# Patient Record
Sex: FEMALE | Race: WHITE | NOT HISPANIC OR LATINO
[De-identification: names, ages, dates, MRNs, and addresses within clinical notes are randomized per-mention and may not be internally consistent; named-entity substitution may affect disease eponyms.]

---

## 2021-08-02 ENCOUNTER — RESULT REVIEW (OUTPATIENT)
Age: 56
End: 2021-08-02

## 2022-04-20 ENCOUNTER — APPOINTMENT (OUTPATIENT)
Dept: UROLOGY | Facility: CLINIC | Age: 57
End: 2022-04-20
Payer: COMMERCIAL

## 2022-04-20 VITALS
BODY MASS INDEX: 32.93 KG/M2 | HEIGHT: 70 IN | SYSTOLIC BLOOD PRESSURE: 131 MMHG | WEIGHT: 230 LBS | HEART RATE: 94 BPM | DIASTOLIC BLOOD PRESSURE: 93 MMHG

## 2022-04-20 PROCEDURE — 99204 OFFICE O/P NEW MOD 45 MIN: CPT

## 2022-04-20 NOTE — ASSESSMENT
[FreeTextEntry1] : \par plan:\par - start pelvic floor exercises\par - start vesicare\par - schedule appointment with Dr. Cisneros to discuss follow up and possible interstim

## 2022-04-20 NOTE — PHYSICAL EXAM
[General Appearance - Well Developed] : well developed [General Appearance - Well Nourished] : well nourished [Normal Appearance] : normal appearance [Well Groomed] : well groomed [General Appearance - In No Acute Distress] : no acute distress [] : no respiratory distress [Respiration, Rhythm And Depth] : normal respiratory rhythm and effort [Oriented To Time, Place, And Person] : oriented to person, place, and time [Exaggerated Use Of Accessory Muscles For Inspiration] : no accessory muscle use [Affect] : the affect was normal [Mood] : the mood was normal [Not Anxious] : not anxious

## 2022-04-20 NOTE — HISTORY OF PRESENT ILLNESS
[Urinary Urgency] : urinary urgency [FreeTextEntry1] : 56 yo F for initial consultation\par no PMH\par 1/2021 head injury with a concussion\par also had neck surgery after that\par allergy to penicillins (hives)\par quit smoking 1.5 years ago\par \par 2017 started complaining of urinary urgency and OAB\par had UDS describing severe OAB with strong desire ate 90 ml and capacity of 102\par there is a uroflow documented with a volume of 500 ml, confirming this is not a small contracted bladder\par \par tried oxybutynin in the past with no effect\par discussed pelvic floor exercises and a trial of vesicare.\par \par \par plan:\par - start pelvic floor exercises\par - start vesicare\par - schedule appointment with Dr. Cisneros to discuss follow up and possible interstim

## 2022-04-27 ENCOUNTER — APPOINTMENT (OUTPATIENT)
Dept: ORTHOPEDIC SURGERY | Facility: CLINIC | Age: 57
End: 2022-04-27
Payer: OTHER MISCELLANEOUS

## 2022-04-27 VITALS — HEIGHT: 70 IN | BODY MASS INDEX: 32.93 KG/M2 | WEIGHT: 230 LBS

## 2022-04-27 DIAGNOSIS — Z78.9 OTHER SPECIFIED HEALTH STATUS: ICD-10-CM

## 2022-04-27 PROCEDURE — 20611 DRAIN/INJ JOINT/BURSA W/US: CPT | Mod: 50

## 2022-04-27 PROCEDURE — 99214 OFFICE O/P EST MOD 30 MIN: CPT | Mod: 25

## 2022-04-27 PROCEDURE — 99072 ADDL SUPL MATRL&STAF TM PHE: CPT

## 2022-04-27 PROCEDURE — J3490M: CUSTOM

## 2022-04-27 NOTE — PHYSICAL EXAM
[Right] : right knee [Left] : left knee [NL (0)] : extension 0 degrees [5___] : hamstring 5[unfilled]/5 [Equivocal] : equivocal Ashley [] : patient ambulates without assistive device [TWNoteComboBox7] : flexion 115 degrees

## 2022-04-27 NOTE — HISTORY OF PRESENT ILLNESS
[6] : 6 [3] : 3 [Dull/Aching] : dull/aching [Sharp] : sharp [de-identified] : WC DOI 1/5/2021\par 1/27/21: bilateral knee made worse after new injury on 1/5/21.\par 2/24/21: bilateral knee pain persists.\par 3/24/21: follow up bilateral knees. reports to improvement with pt and injections. still having some mild symptoms but overall better.\par 5/5/21: bilateral knee pain still present. right worse than left today.\par 5/26/21: follow up bilateral knees. recent superficial lac on right knee from palm tree on 5/22/21 - received 14 sutures in er\par 6/23/21: bilateral knee pain still. superficial wound right medial knee doing well - sutures removed; cleared by pcp.\par 7/28/21: bilateral knee pain improved but still has some symptoms.\par 9/1/21: bilateral knee pain still present.\par 10/13/21: bilateral knee pain. just restarted pt.\par 11/24/21: bilateral knee pain persists. PT helps.\par 1/5/22: bilateral knee pain mildly improved but still present.\par 2/16/22: bilateral knee pain.\par 3/2/22: bilateral knee pain.\par 3/9/22: bilateral knee pain.\par 3/16/22: bilateral knee pain persists.\par 4/27/22:  follow up bilateral knees.  some relief with visco but does get flares up with weather changes and prolonged activities.  reports to tightness.  [FreeTextEntry1] : kingt knees [de-identified] : massage

## 2022-04-27 NOTE — WORK
[Total] : total [Does not reveal pre-existing condition(s) that may affect treatment/prognosis] : does not reveal pre-existing condition(s) that may affect treatment/prognosis [Cannot return to work because ________] : cannot return to work because [unfilled] [Bending/Twisting] : bending/twisting [Climbing stairs/Ladders] : climbing stairs/ladders [Kneeling] : kneeling [Walking] : walking [Standing] : standing [Patient] : patient [No Rx restrictions] : No Rx restrictions. [I provided the services listed above] :  I provided the services listed above. 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

## 2022-04-27 NOTE — DISCUSSION/SUMMARY
[de-identified] : Instructions:  Progress Note completed by Daphne Porter PA-C\par * Dr. Rosas -- The documentation recorded accurately reflects the decisions made by me during this visit.

## 2022-04-27 NOTE — ASSESSMENT
[FreeTextEntry1] : new injury on 1/5/21 when large monitor fell off wall at work and pinned her again the desk after pushing her back. history of knee scopes. worse pain after accident. \par \par mris suggest new tear left mm and oa exacerbation. left knee pain persists. PT helps.\par right knee mri suggest oa exacerbation right knee with sprain. PT helps.\par \par has concussion symptoms as well which she is getting treatment for.\par scheduled for neck surgery on 8/2/21.

## 2022-04-27 NOTE — PROCEDURE
[FreeTextEntry3] : Procedure Name: Large Joint Injection / Aspiration: Celestone, Lidocaine, Evaluation and Guidance Ultrasound\par \par Large Joint Injection was performed because of pain and inflammation. Anesthesia: ethyl chloride sprayed topically.\par Celestone: An injection of Celestone 6 mg , 1 cc.\par Lidocaine 1%: 3 cc.\par Marcaine 0.25%:  3 cc.\par \par Patient has tried OTC's including aspirin, Ibuprofen, Aleve etc or prescription NSAIDS, and/or exercises at home and/ or physical therapy without satisfactory response and Patient has decreased mobility in the joint. The risks, benefits, and alternatives to cortisone injection were explained in full to the patient. Risks outlined include but are not limited to infection, sepsis, bleeding, scarring, skin discoloration, temporary increase in pain, syncopal episode, failure to resolve symptoms, allergic reaction, symptom recurrence, and elevation of blood sugar in diabetics. Patient understood the risks. All questions were answered.   Oral informed consent was obtained.   Sterile technique was utilized for the procedure including the preparation of the solutions used for the injection. Medication was injected into LEFT KNEE.   Patient tolerated the procedure well. Advised to ice the injection site this evening.  Post Procedure Instructions: Patient was advised to call if redness, pain, or fever occur and apply ice for 15 min. out of every hour for the next 12-24 hours as tolerated. patient was advised to rest the joint(s) for 2 days. \par \par Ultrasound Extremity (11334) was used because of the following reasons: inflammation.\par Ultrasound Guidance was used for the following reasons: for accurate placement of needle into knee joint.\par Diagnostic ultrasound was performed of the knee and is positive for synovitis.\par Ultrasound guided injection was performed of the knee, visualization of the needle and placement of injection was performed without complication.\par

## 2022-05-04 ENCOUNTER — APPOINTMENT (OUTPATIENT)
Dept: UROLOGY | Facility: CLINIC | Age: 57
End: 2022-05-04
Payer: COMMERCIAL

## 2022-05-04 DIAGNOSIS — Z83.511 FAMILY HISTORY OF GLAUCOMA: ICD-10-CM

## 2022-05-04 DIAGNOSIS — Z82.3 FAMILY HISTORY OF STROKE: ICD-10-CM

## 2022-05-04 DIAGNOSIS — Z83.3 FAMILY HISTORY OF DIABETES MELLITUS: ICD-10-CM

## 2022-05-04 DIAGNOSIS — Z82.49 FAMILY HISTORY OF ISCHEMIC HEART DISEASE AND OTHER DISEASES OF THE CIRCULATORY SYSTEM: ICD-10-CM

## 2022-05-04 PROCEDURE — 99213 OFFICE O/P EST LOW 20 MIN: CPT

## 2022-05-04 RX ORDER — ERGOCALCIFEROL 1.25 MG/1
1.25 MG CAPSULE, LIQUID FILLED ORAL
Qty: 4 | Refills: 0 | Status: ACTIVE | COMMUNITY
Start: 2022-05-04

## 2022-05-04 NOTE — ASSESSMENT
[FreeTextEntry1] : Impression:\par \par urinary frequency and urgency. \par \par Plan:\par \par continue solifenacin\par can increase dose if needed\par Continue Kegel's\par can proceed to Interstim, PTNS or increased dose of solifenacin as needed. \par Follow up in 3 months. \par

## 2022-05-04 NOTE — HISTORY OF PRESENT ILLNESS
[FreeTextEntry1] : Patient has urgency and incontinence.  Has urinary frequency.  has been doing kegel's  urgency.  She has been using a panty liner but has not  worn one recently since starting solifenacin. has dry mouth but no significant constipation.  has been under

## 2022-05-04 NOTE — LETTER BODY
[Dear  ___] : Dear  [unfilled], [Courtesy Letter:] : I had the pleasure of seeing your patient, [unfilled], in my office today. [Please see my note below.] : Please see my note below. [Sincerely,] : Sincerely, [FreeTextEntry3] : Ed\par \par Ridge Cisneros MD\par [facility]\par  of Urology\par [employer] [facility]\par

## 2022-05-08 ENCOUNTER — RX RENEWAL (OUTPATIENT)
Age: 57
End: 2022-05-08

## 2022-06-08 ENCOUNTER — APPOINTMENT (OUTPATIENT)
Dept: ORTHOPEDIC SURGERY | Facility: CLINIC | Age: 57
End: 2022-06-08
Payer: OTHER MISCELLANEOUS

## 2022-06-08 VITALS — BODY MASS INDEX: 32.93 KG/M2 | WEIGHT: 230 LBS | HEIGHT: 70 IN

## 2022-06-08 PROCEDURE — 99214 OFFICE O/P EST MOD 30 MIN: CPT | Mod: 25

## 2022-06-08 PROCEDURE — J3490M: CUSTOM

## 2022-06-08 PROCEDURE — 99072 ADDL SUPL MATRL&STAF TM PHE: CPT

## 2022-06-08 PROCEDURE — 20611 DRAIN/INJ JOINT/BURSA W/US: CPT | Mod: RT

## 2022-06-08 NOTE — ASSESSMENT
[FreeTextEntry1] : new injury on 1/5/21 when large monitor fell off wall at work and pinned her again the desk after pushing her back. history of knee scopes. worse pain after accident. \par \par mris suggest new tear left mm and oa exacerbation. left knee pain persists.injection helped.\par right knee mri suggest oa exacerbation right knee with sprain. PT helps. pain persists.\par \par has concussion symptoms as well which she is getting treatment for.\par scheduled for neck surgery on 8/2/21.

## 2022-06-08 NOTE — PROCEDURE
[Large Joint Injection] : Large joint injection [Right] : of the right [Knee] : knee [Pain] : pain [Alcohol] : alcohol [Betadine] : betadine [___ cc    3mg] :  Betamethasone (Celestone) ~Vcc of 3mg [___ cc    1%] : Lidocaine ~Vcc of 1%  [___ cc    0.25%] : Bupivacaine (Marcaine) ~Vcc of 0.25%  [] : Patient tolerated procedure well [Call if redness, pain or fever occur] : call if redness, pain or fever occur [Apply ice for 15min out of every hour for the next 12-24 hours as tolerated] : apply ice for 15 minutes out of every hour for the next 12-24 hours as tolerated [Patient was advised to rest the joint(s) for ____ days] : patient was advised to rest the joint(s) for [unfilled] days [Previous OTC use and PT nontherapeutic] : patient has tried OTC's including aspirin, Ibuprofen, Aleve, etc or prescription NSAIDS, and/or exercises at home and/or physical therapy without satisfactory response [Patient had decreased mobility in the joint] : patient had decreased mobility in the joint [Risks, benefits, alternatives discussed / Verbal consent obtained] : the risks benefits, and alternatives have been discussed, and verbal consent was obtained [All ultrasound images have been permanently captured and stored accordingly in our picture archiving and communication system] : All ultrasound images have been permanently captured and stored accordingly in our picture archiving and communication system [Visualization of the needle and placement of injection was performed without complication] : visualization of the needle and placement of injection was performed without complication [Inflammation] : inflammation

## 2022-06-08 NOTE — HISTORY OF PRESENT ILLNESS
[8] : 8 [3] : 3 [de-identified] : WC DOI 1/5/2021\par 1/27/21: bilateral knee made worse after new injury on 1/5/21.\par 2/24/21: bilateral knee pain persists.\par 3/24/21: follow up bilateral knees. reports to improvement with pt and injections. still having some mild symptoms but overall better.\par 5/5/21: bilateral knee pain still present. right worse than left today.\par 5/26/21: follow up bilateral knees. recent superficial lac on right knee from palm tree on 5/22/21 - received 14 sutures in er\par 6/23/21: bilateral knee pain still. superficial wound right medial knee doing well - sutures removed; cleared by pcp.\par 7/28/21: bilateral knee pain improved but still has some symptoms.\par 9/1/21: bilateral knee pain still present.\par 10/13/21: bilateral knee pain. just restarted pt.\par 11/24/21: bilateral knee pain persists. PT helps.\par 1/5/22: bilateral knee pain mildly improved but still present.\par 2/16/22: bilateral knee pain.\par 3/2/22: bilateral knee pain.\par 3/9/22: bilateral knee pain.\par 3/16/22: bilateral knee pain persists.\par 4/27/22:  follow up bilateral knees.  some relief with visco but does get flares up with weather changes and prolonged activities.  reports to tightness. \par 6/8/22:  left knee pain improved.  right knee pain persists. [FreeTextEntry1] : joseline knees [de-identified] : overuse  [de-identified] : cortisone injection lt knee \par PT

## 2022-06-15 ENCOUNTER — APPOINTMENT (OUTPATIENT)
Dept: ORTHOPEDIC SURGERY | Facility: CLINIC | Age: 57
End: 2022-06-15
Payer: OTHER MISCELLANEOUS

## 2022-06-15 VITALS — WEIGHT: 230 LBS | HEIGHT: 70 IN | BODY MASS INDEX: 32.93 KG/M2

## 2022-06-15 PROCEDURE — 99213 OFFICE O/P EST LOW 20 MIN: CPT

## 2022-06-15 PROCEDURE — 99072 ADDL SUPL MATRL&STAF TM PHE: CPT

## 2022-06-15 NOTE — DISCUSSION/SUMMARY
[de-identified] : Instructions:  Progress Note completed by Daphne Porter PA-C\par * Dr. Rosas -- The documentation recorded accurately reflects the decisions made by me during this visit.

## 2022-06-15 NOTE — ASSESSMENT
[FreeTextEntry1] : new injury on 1/5/21 when large monitor fell off wall at work and pinned her again the desk after pushing her back. history of knee scopes. worse pain after accident. \par \par mris 2021  suggest new tear left mm and oa exacerbation. left knee pain persists.injection helped.\par right knee mri 2021 suggest oa exacerbation right knee with sprain. PT helps. had injection 6/8/22 with minimal relief. \par \par has concussion symptoms as well which she is getting treatment for.\par scheduled for neck surgery on 8/2/21.

## 2022-06-15 NOTE — HISTORY OF PRESENT ILLNESS
[Work related] : work related [Not working due to injury] : Work status: not working due to injury [] : yes [8] : 8 [3] : 3 [de-identified] : WC DOI 1/5/2021\par 1/27/21: bilateral knee made worse after new injury on 1/5/21.\par 2/24/21: bilateral knee pain persists.\par 3/24/21: follow up bilateral knees. reports to improvement with pt and injections. still having some mild symptoms but overall better.\par 5/5/21: bilateral knee pain still present. right worse than left today.\par 5/26/21: follow up bilateral knees. recent superficial lac on right knee from palm tree on 5/22/21 - received 14 sutures in er\par 6/23/21: bilateral knee pain still. superficial wound right medial knee doing well - sutures removed; cleared by pcp.\par 7/28/21: bilateral knee pain improved but still has some symptoms.\par 9/1/21: bilateral knee pain still present.\par 10/13/21: bilateral knee pain. just restarted pt.\par 11/24/21: bilateral knee pain persists. PT helps.\par 1/5/22: bilateral knee pain mildly improved but still present.\par 2/16/22: bilateral knee pain.\par 3/2/22: bilateral knee pain.\par 3/9/22: bilateral knee pain.\par 3/16/22: bilateral knee pain persists.\par 4/27/22:  follow up bilateral knees.  some relief with visco but does get flares up with weather changes and prolonged activities.  reports to tightness. \par 6/8/22:  left knee pain improved.  right knee pain persists.\par 6/15/22:  right knee pain continues despite csi on right knee last visit.   [FreeTextEntry1] : joseline knees [FreeTextEntry3] : 01/05/21  [de-identified] : overuse  [de-identified] : cortisone injection lt knee \par PT

## 2022-06-30 ENCOUNTER — FORM ENCOUNTER (OUTPATIENT)
Age: 57
End: 2022-06-30

## 2022-07-13 ENCOUNTER — APPOINTMENT (OUTPATIENT)
Dept: ORTHOPEDIC SURGERY | Facility: CLINIC | Age: 57
End: 2022-07-13

## 2022-07-13 VITALS — WEIGHT: 230 LBS | BODY MASS INDEX: 32.93 KG/M2 | HEIGHT: 70 IN

## 2022-07-13 PROCEDURE — 99072 ADDL SUPL MATRL&STAF TM PHE: CPT

## 2022-07-13 PROCEDURE — 99213 OFFICE O/P EST LOW 20 MIN: CPT

## 2022-07-13 NOTE — HISTORY OF PRESENT ILLNESS
[10] : 10 [4] : 4 [Shooting] : shooting [Stabbing] : stabbing [Throbbing] : throbbing [de-identified] : WC DOI 1/5/2021\par 1/27/21: bilateral knee made worse after new injury on 1/5/21.\par 2/24/21: bilateral knee pain persists.\par 3/24/21: follow up bilateral knees. reports to improvement with pt and injections. still having some mild symptoms but overall better.\par 5/5/21: bilateral knee pain still present. right worse than left today.\par 5/26/21: follow up bilateral knees. recent superficial lac on right knee from palm tree on 5/22/21 - received 14 sutures in er\par 6/23/21: bilateral knee pain still. superficial wound right medial knee doing well - sutures removed; cleared by pcp.\par 7/28/21: bilateral knee pain improved but still has some symptoms.\par 9/1/21: bilateral knee pain still present.\par 10/13/21: bilateral knee pain. just restarted pt.\par 11/24/21: bilateral knee pain persists. PT helps.\par 1/5/22: bilateral knee pain mildly improved but still present.\par 2/16/22: bilateral knee pain.\par 3/2/22: bilateral knee pain.\par 3/9/22: bilateral knee pain.\par 3/16/22: bilateral knee pain persists.\par 4/27/22:  follow up bilateral knees.  some relief with visco but does get flares up with weather changes and prolonged activities.  reports to tightness. \par 6/8/22:  left knee pain improved.  right knee pain persists.\par 6/15/22:  right knee pain continues despite csi on right knee last visit.  \par 7/13/22:  no approval for pt.  still intermittent episodes of pain.  [FreeTextEntry1] : bilat knees  [de-identified] : pt

## 2022-07-13 NOTE — PHYSICAL EXAM
[Right] : right knee [Left] : left knee [NL (0)] : extension 0 degrees [5___] : hamstring 5[unfilled]/5 [Equivocal] : equivocal Ashley [] : no erythema [TWNoteComboBox7] : flexion 125 degrees

## 2022-07-13 NOTE — DISCUSSION/SUMMARY
[de-identified] : Instructions:  Progress Note completed by Daphne Porter PA-C\par * Dr. Rosas -- The documentation recorded accurately reflects the decisions made by me during this visit.

## 2022-08-08 ENCOUNTER — RX RENEWAL (OUTPATIENT)
Age: 57
End: 2022-08-08

## 2022-08-08 ENCOUNTER — APPOINTMENT (OUTPATIENT)
Dept: UROLOGY | Facility: CLINIC | Age: 57
End: 2022-08-08

## 2022-08-08 VITALS — SYSTOLIC BLOOD PRESSURE: 126 MMHG | DIASTOLIC BLOOD PRESSURE: 83 MMHG | HEART RATE: 76 BPM

## 2022-08-08 PROCEDURE — 99214 OFFICE O/P EST MOD 30 MIN: CPT

## 2022-08-08 RX ORDER — FLUCONAZOLE 150 MG/1
150 TABLET ORAL
Qty: 10 | Refills: 0 | Status: DISCONTINUED | COMMUNITY
Start: 2022-07-15

## 2022-08-08 RX ORDER — DEXAMETHASONE 1 MG/1
1 TABLET ORAL
Qty: 1 | Refills: 0 | Status: DISCONTINUED | COMMUNITY
Start: 2022-04-15

## 2022-08-08 RX ORDER — TRETINOIN 0.25 MG/G
0.03 CREAM TOPICAL
Qty: 60 | Refills: 0 | Status: DISCONTINUED | COMMUNITY
Start: 2022-06-02

## 2022-08-08 RX ORDER — TERCONAZOLE 8 MG/G
0.8 CREAM VAGINAL
Qty: 20 | Refills: 0 | Status: DISCONTINUED | COMMUNITY
Start: 2022-04-12

## 2022-08-08 RX ORDER — MUPIROCIN 20 MG/G
2 OINTMENT TOPICAL
Qty: 22 | Refills: 0 | Status: DISCONTINUED | COMMUNITY
Start: 2022-03-22

## 2022-08-08 RX ORDER — DICLOFENAC SODIUM 75 MG/1
75 TABLET, DELAYED RELEASE ORAL
Qty: 30 | Refills: 0 | Status: DISCONTINUED | COMMUNITY
Start: 2022-07-19

## 2022-08-08 RX ORDER — DOXYCYCLINE HYCLATE 100 MG/1
100 CAPSULE ORAL
Qty: 14 | Refills: 0 | Status: DISCONTINUED | COMMUNITY
Start: 2022-03-22

## 2022-08-08 NOTE — LETTER BODY
[Dear  ___] : Dear  [unfilled], [Courtesy Letter:] : I had the pleasure of seeing your patient, [unfilled], in my office today. [Please see my note below.] : Please see my note below. [Sincerely,] : Sincerely, [FreeTextEntry3] : Ed\par \par Ridge Cisneros MD\par [facility]\par  of Urology\par [employer] at [facility]\par

## 2022-08-08 NOTE — HISTORY OF PRESENT ILLNESS
[FreeTextEntry1] : Patient on solifenacin.  She is voiding without incontinence. No urgency.  voids about every 1.5 hour. is drinking lots of fluids.  Has not tried

## 2022-08-08 NOTE — ASSESSMENT
[FreeTextEntry1] : Impression:\par \par OAB, frequency\par \par Plan:\par \par start solifenacin\par follow up in 4 weeks.

## 2022-08-24 ENCOUNTER — APPOINTMENT (OUTPATIENT)
Dept: ORTHOPEDIC SURGERY | Facility: CLINIC | Age: 57
End: 2022-08-24

## 2022-08-24 VITALS — HEIGHT: 70 IN | BODY MASS INDEX: 32.93 KG/M2 | WEIGHT: 230 LBS

## 2022-08-24 PROCEDURE — 99072 ADDL SUPL MATRL&STAF TM PHE: CPT

## 2022-08-24 PROCEDURE — 99213 OFFICE O/P EST LOW 20 MIN: CPT

## 2022-08-24 NOTE — HISTORY OF PRESENT ILLNESS
[Work related] : work related [10] : 10 [3] : 3 [de-identified] :  DOI 1/5/2021\par 1/27/21: bilateral knee made worse after new injury on 1/5/21.\par 2/24/21: bilateral knee pain persists.\par 3/24/21: follow up bilateral knees. reports to improvement with pt and injections. still having some mild symptoms but overall better.\par 5/5/21: bilateral knee pain still present. right worse than left today.\par 5/26/21: follow up bilateral knees. recent superficial lac on right knee from palm tree on 5/22/21 - received 14 sutures in er\par 6/23/21: bilateral knee pain still. superficial wound right medial knee doing well - sutures removed; cleared by pcp.\par 7/28/21: bilateral knee pain improved but still has some symptoms.\par 9/1/21: bilateral knee pain still present.\par 10/13/21: bilateral knee pain. just restarted pt.\par 11/24/21: bilateral knee pain persists. PT helps.\par 1/5/22: bilateral knee pain mildly improved but still present.\par 2/16/22: bilateral knee pain.\par 3/2/22: bilateral knee pain.\par 3/9/22: bilateral knee pain.\par 3/16/22: bilateral knee pain persists.\par 4/27/22:  follow up bilateral knees.  some relief with visco but does get flares up with weather changes and prolonged activities.  reports to tightness. \par 6/8/22:  left knee pain improved.  right knee pain persists.\par 6/15/22:  right knee pain continues despite csi on right knee last visit.  \par 7/13/22:  no approval for pt.  still intermittent episodes of pain. \par 8/24/22:  follow up bilateral knees.  still auth pending for pt.  continued tightness and stiffness in knees.  [FreeTextEntry1] : bilateral knees  [FreeTextEntry3] : 01/05/2021 [de-identified] : none

## 2022-08-24 NOTE — DISCUSSION/SUMMARY
[de-identified] : Progress note completed by Daphne Porter PA-C\par *Dr. Rosas - The [facility] assigned on this date saw this patient independently.  I have reviewed the note and agree with the treatment provided.

## 2022-08-24 NOTE — PHYSICAL EXAM
[Right] : right knee [Left] : left knee [NL (0)] : extension 0 degrees [5___] : hamstring 5[unfilled]/5 [Equivocal] : equivocal Ashley [] : tenderness [TWNoteComboBox7] : flexion 125 degrees

## 2022-08-24 NOTE — ASSESSMENT
[FreeTextEntry1] : new injury on 1/5/21 when large monitor fell off wall at work and pinned her again the desk after pushing her back. history of knee scopes. worse pain after accident. \par \par mris 2021  suggest new tear left mm and oa exacerbation. continued pain.  no pt \par right knee mri 2021 suggest oa exacerbation right knee with sprain.  csi on 6/8/22.  still pain. \par \par has concussion symptoms as well which she is getting treatment for.\par scheduled for neck surgery on 8/2/21.

## 2022-09-07 ENCOUNTER — APPOINTMENT (OUTPATIENT)
Dept: UROLOGY | Facility: CLINIC | Age: 57
End: 2022-09-07

## 2022-09-07 VITALS — DIASTOLIC BLOOD PRESSURE: 71 MMHG | HEART RATE: 92 BPM | SYSTOLIC BLOOD PRESSURE: 107 MMHG

## 2022-09-07 DIAGNOSIS — N32.81 OVERACTIVE BLADDER: ICD-10-CM

## 2022-09-07 PROCEDURE — 99213 OFFICE O/P EST LOW 20 MIN: CPT

## 2022-09-07 RX ORDER — SOLIFENACIN SUCCINATE 5 MG/1
5 TABLET ORAL
Qty: 20 | Refills: 0 | Status: DISCONTINUED | COMMUNITY
Start: 2022-04-20 | End: 2022-09-07

## 2022-09-07 RX ORDER — NORTRIPTYLINE HYDROCHLORIDE 10 MG/1
10 CAPSULE ORAL
Qty: 90 | Refills: 0 | Status: DISCONTINUED | COMMUNITY
Start: 2022-07-13 | End: 2022-09-07

## 2022-09-07 RX ORDER — METHOCARBAMOL 750 MG/1
750 TABLET, FILM COATED ORAL
Qty: 15 | Refills: 0 | Status: DISCONTINUED | COMMUNITY
Start: 2022-07-20 | End: 2022-09-07

## 2022-09-07 NOTE — LETTER BODY
[Dear  ___] : Dear  [unfilled], [Courtesy Letter:] : I had the pleasure of seeing your patient, [unfilled], in my office today. [Please see my note below.] : Please see my note below. [Sincerely,] : Sincerely, [FreeTextEntry3] : Ed\par \par Ridge Cisneros MD\par [facility]\par  of Urology\par [employer] [facility]\par

## 2022-09-07 NOTE — ASSESSMENT
[FreeTextEntry1] : Impression:\par \par OAB\par \par Plan:\par \par refill solifenacin\par followup in 6 months.

## 2022-09-07 NOTE — HISTORY OF PRESENT ILLNESS
[FreeTextEntry1] : On solifenacin .  Nocturia 2 or less.  Daytime frequency is improved.  Has some dry mouth.   No appreciable constipation.

## 2022-09-21 ENCOUNTER — APPOINTMENT (OUTPATIENT)
Dept: ORTHOPEDIC SURGERY | Facility: CLINIC | Age: 57
End: 2022-09-21

## 2022-09-21 VITALS — BODY MASS INDEX: 32.93 KG/M2 | HEIGHT: 70 IN | WEIGHT: 230 LBS

## 2022-09-21 PROCEDURE — 99072 ADDL SUPL MATRL&STAF TM PHE: CPT

## 2022-09-21 PROCEDURE — 99213 OFFICE O/P EST LOW 20 MIN: CPT

## 2022-09-21 NOTE — DISCUSSION/SUMMARY
[de-identified] : Instructions:  Progress Note completed by Daphne Porter PA-C\par * Dr. Rosas -- The documentation recorded accurately reflects the decisions made by me during this visit.

## 2022-09-21 NOTE — HISTORY OF PRESENT ILLNESS
[Work related] : work related [10] : 10 [3] : 3 [de-identified] : WC DOI 1/5/2021\par 1/27/21: bilateral knee made worse after new injury on 1/5/21.\par 2/24/21: bilateral knee pain persists.\par 3/24/21: follow up bilateral knees. reports to improvement with pt and injections. still having some mild symptoms but overall better.\par 5/5/21: bilateral knee pain still present. right worse than left today.\par 5/26/21: follow up bilateral knees. recent superficial lac on right knee from palm tree on 5/22/21 - received 14 sutures in er\par 6/23/21: bilateral knee pain still. superficial wound right medial knee doing well - sutures removed; cleared by pcp.\par 7/28/21: bilateral knee pain improved but still has some symptoms.\par 9/1/21: bilateral knee pain still present.\par 10/13/21: bilateral knee pain. just restarted pt.\par 11/24/21: bilateral knee pain persists. PT helps.\par 1/5/22: bilateral knee pain mildly improved but still present.\par 2/16/22: bilateral knee pain.\par 3/2/22: bilateral knee pain.\par 3/9/22: bilateral knee pain.\par 3/16/22: bilateral knee pain persists.\par 4/27/22:  follow up bilateral knees.  some relief with visco but does get flares up with weather changes and prolonged activities.  reports to tightness. \par 6/8/22:  left knee pain improved.  right knee pain persists.\par 6/15/22:  right knee pain continues despite csi on right knee last visit.  \par 7/13/22:  no approval for pt.  still intermittent episodes of pain. \par 8/24/22:  follow up bilateral knees.  still auth pending for pt.  continued tightness and stiffness in knees. \par 9/21/22:  bilateral knee pain stable.  improves with pt but no auth yet.  [FreeTextEntry1] : bilateral knees  [FreeTextEntry3] : 01/05/2021 [de-identified] : none

## 2022-11-02 ENCOUNTER — APPOINTMENT (OUTPATIENT)
Dept: ORTHOPEDIC SURGERY | Facility: CLINIC | Age: 57
End: 2022-11-02

## 2022-11-02 VITALS — BODY MASS INDEX: 32.93 KG/M2 | HEIGHT: 70 IN | WEIGHT: 230 LBS

## 2022-11-02 PROCEDURE — J3490M: CUSTOM

## 2022-11-02 PROCEDURE — 20611 DRAIN/INJ JOINT/BURSA W/US: CPT | Mod: RT

## 2022-11-02 PROCEDURE — 99072 ADDL SUPL MATRL&STAF TM PHE: CPT

## 2022-11-02 PROCEDURE — 99214 OFFICE O/P EST MOD 30 MIN: CPT | Mod: 25

## 2022-11-02 NOTE — DISCUSSION/SUMMARY
[de-identified] : Progress note completed by Daphne Porter PA-C\par *Dr. Rosas - The [facility] assigned on this date saw this patient independently.  I have reviewed the note and agree with the treatment provided.

## 2022-11-02 NOTE — ASSESSMENT
[FreeTextEntry1] : new injury on 1/5/21 when large monitor fell off wall at work and pinned her again the desk after pushing her back. history of knee scopes. worse pain after accident. \par \par mris 2021  suggest new tear left mm and oa exacerbation. \par right knee mri 2021 suggest oa exacerbation right knee with sprain.  \par \par has concussion symptoms as well which she is getting treatment for.\par scheduled for neck surgery on 8/2/21.

## 2022-11-02 NOTE — PROCEDURE
[FreeTextEntry3] : Procedure Name: Large Joint Injection / Aspiration: Celestone, Lidocaine and Marcaine with Ultrasound Evaluation and Guidance\par \par Large Joint Injection was performed because of pain and inflammation. Anesthesia: ethyl chloride sprayed topically.\par Celestone: An injection of Celestone 6 mg , 1 cc.\par Lidocaine 1%: 3 cc.\par Marcaine 0.25%:  3 cc.\par \par Patient has tried OTC's including aspirin, Ibuprofen, Aleve etc or prescription NSAIDS, and/or exercises at home and/ or physical therapy without satisfactory response and Patient has decreased mobility in the joint. The risks, benefits, and alternatives to cortisone injection were explained in full to the patient. Risks outlined include but are not limited to infection, sepsis, bleeding, scarring, skin discoloration, temporary increase in pain, syncopal episode, failure to resolve symptoms, allergic reaction, symptom recurrence, and elevation of blood sugar in diabetics. Patient understood the risks. All questions were answered.   Oral informed consent was obtained.   Sterile technique was utilized for the procedure including the preparation of the solutions used for the injection. Medication was injected into RIGHT KNEE.   Patient tolerated the procedure well. Advised to ice the injection site this evening.  Post Procedure Instructions: Patient was advised to call if redness, pain, or fever occur and apply ice for 15 min. out of every hour for the next 12-24 hours as tolerated. patient was advised to rest the joint(s) for 2 days. \par \par Ultrasound Extremity (51484) was used because of the following reasons: inflammation.\par Ultrasound Guidance was used for the following reasons: for accurate placement of needle into knee joint\par Diagnostic ultrasound was performed of the knee and is positive for synovitis.\par Ultrasound guided injection was performed of the knee, visualization of the needle and placement of injection was performed without complication.\par \par

## 2022-11-02 NOTE — HISTORY OF PRESENT ILLNESS
[Work related] : work related [Not working due to injury] : Work status: not working due to injury [] : yes [10] : 10 [3] : 3 [de-identified] : WC DOI 1/5/2021\par 1/27/21: bilateral knee made worse after new injury on 1/5/21.\par 2/24/21: bilateral knee pain persists.\par 3/24/21: follow up bilateral knees. reports to improvement with pt and injections. still having some mild symptoms but overall better.\par 5/5/21: bilateral knee pain still present. right worse than left today.\par 5/26/21: follow up bilateral knees. recent superficial lac on right knee from palm tree on 5/22/21 - received 14 sutures in er\par 6/23/21: bilateral knee pain still. superficial wound right medial knee doing well - sutures removed; cleared by pcp.\par 7/28/21: bilateral knee pain improved but still has some symptoms.\par 9/1/21: bilateral knee pain still present.\par 10/13/21: bilateral knee pain. just restarted pt.\par 11/24/21: bilateral knee pain persists. PT helps.\par 1/5/22: bilateral knee pain mildly improved but still present.\par 2/16/22: bilateral knee pain.\par 3/2/22: bilateral knee pain.\par 3/9/22: bilateral knee pain.\par 3/16/22: bilateral knee pain persists.\par 4/27/22:  follow up bilateral knees.  some relief with visco but does get flares up with weather changes and prolonged activities.  reports to tightness. \par 6/8/22:  left knee pain improved.  right knee pain persists.\par 6/15/22:  right knee pain continues despite csi on right knee last visit.  \par 7/13/22:  no approval for pt.  still intermittent episodes of pain. \par 8/24/22:  follow up bilateral knees.  still auth pending for pt.  continued tightness and stiffness in knees. \par 9/21/22:  bilateral knee pain stable.  improves with pt but no auth yet. \par 11/2/22:  bilateral knee pain still.  right knee worse.  no auth yet. [FreeTextEntry1] : bilateral knees  [FreeTextEntry3] : 01/05/2021 [de-identified] : none

## 2022-12-14 ENCOUNTER — APPOINTMENT (OUTPATIENT)
Dept: ORTHOPEDIC SURGERY | Facility: CLINIC | Age: 57
End: 2022-12-14

## 2022-12-14 PROCEDURE — 99072 ADDL SUPL MATRL&STAF TM PHE: CPT

## 2022-12-14 PROCEDURE — 99214 OFFICE O/P EST MOD 30 MIN: CPT | Mod: 25

## 2022-12-14 PROCEDURE — J3490M: CUSTOM

## 2022-12-14 PROCEDURE — 20611 DRAIN/INJ JOINT/BURSA W/US: CPT

## 2022-12-14 NOTE — PHYSICAL EXAM
[Right] : right knee [Left] : left knee [NL (0)] : extension 0 degrees [5___] : hamstring 5[unfilled]/5 [Equivocal] : equivocal Ashley [] : patient ambulates without assistive device [TWNoteComboBox7] : flexion 125 degrees

## 2022-12-14 NOTE — PROCEDURE
[FreeTextEntry3] : Procedure Name: Large Joint Injection / Aspiration: Celestone, Lidocaine and Marcaine with Ultrasound Evaluation and Guidance\par \par Large Joint Injection was performed because of pain and inflammation. Anesthesia: ethyl chloride sprayed topically.\par Celestone: An injection of Celestone 6 mg , 1 cc.\par Lidocaine 1%: 3 cc.\par Marcaine 0.25%:  3 cc.\par \par Patient has tried OTC's including aspirin, Ibuprofen, Aleve etc or prescription NSAIDS, and/or exercises at home and/ or physical therapy without satisfactory response and Patient has decreased mobility in the joint. The risks, benefits, and alternatives to cortisone injection were explained in full to the patient. Risks outlined include but are not limited to infection, sepsis, bleeding, scarring, skin discoloration, temporary increase in pain, syncopal episode, failure to resolve symptoms, allergic reaction, symptom recurrence, and elevation of blood sugar in diabetics. Patient understood the risks. All questions were answered.   Oral informed consent was obtained.   Sterile technique was utilized for the procedure including the preparation of the solutions used for the injection. Medication was injected into LEFT KNEE.   Patient tolerated the procedure well. Advised to ice the injection site this evening.  Post Procedure Instructions: Patient was advised to call if redness, pain, or fever occur and apply ice for 15 min. out of every hour for the next 12-24 hours as tolerated. patient was advised to rest the joint(s) for 2 days. \par \par Ultrasound Extremity (13961) was used because of the following reasons: inflammation.\par Ultrasound Guidance was used for the following reasons: for accurate placement of needle into knee joint.\par Diagnostic ultrasound was performed of the knee and is positive for synovitis.\par Ultrasound guided injection was performed of the knee, visualization of the needle and placement of injection was performed without complication.\par

## 2022-12-14 NOTE — DISCUSSION/SUMMARY
[de-identified] : Progress note completed by Daphne Porter PA-C\par *Dr. Rosas - The [facility] assigned on this date saw this patient independently.  I have reviewed the note and agree with the treatment provided.

## 2022-12-14 NOTE — ASSESSMENT
[FreeTextEntry1] : new injury on 1/5/21 when large monitor fell off wall at work and pinned her again the desk after pushing her back. history of knee scopes. worse pain after accident. \par \par mris 2021  suggest new tear left mm and oa exacerbation. \par right knee mri 2021 suggest oa exacerbation right knee with sprain.   csi on 11/2/22.\par \par has concussion symptoms as well which she is getting treatment for.\par scheduled for neck surgery on 8/2/21.

## 2022-12-14 NOTE — HISTORY OF PRESENT ILLNESS
[de-identified] :  DOI 1/5/2021\par 1/27/21: bilateral knee made worse after new injury on 1/5/21.\par 2/24/21: bilateral knee pain persists.\par 3/24/21: follow up bilateral knees. reports to improvement with pt and injections. still having some mild symptoms but overall better.\par 5/5/21: bilateral knee pain still present. right worse than left today.\par 5/26/21: follow up bilateral knees. recent superficial lac on right knee from palm tree on 5/22/21 - received 14 sutures in er\par 6/23/21: bilateral knee pain still. superficial wound right medial knee doing well - sutures removed; cleared by pcp.\par 7/28/21: bilateral knee pain improved but still has some symptoms.\par 9/1/21: bilateral knee pain still present.\par 10/13/21: bilateral knee pain. just restarted pt.\par 11/24/21: bilateral knee pain persists. PT helps.\par 1/5/22: bilateral knee pain mildly improved but still present.\par 2/16/22: bilateral knee pain.\par 3/2/22: bilateral knee pain.\par 3/9/22: bilateral knee pain.\par 3/16/22: bilateral knee pain persists.\par 4/27/22:  follow up bilateral knees.  some relief with visco but does get flares up with weather changes and prolonged activities.  reports to tightness. \par 6/8/22:  left knee pain improved.  right knee pain persists.\par 6/15/22:  right knee pain continues despite csi on right knee last visit.  \par 7/13/22:  no approval for pt.  still intermittent episodes of pain. \par 8/24/22:  follow up bilateral knees.  still auth pending for pt.  continued tightness and stiffness in knees. \par 9/21/22:  bilateral knee pain stable.  improves with pt but no auth yet. \par 11/2/22:  bilateral knee pain still.  right knee worse.  no auth yet.\par 12/14/22:  follow up bilateral knees.

## 2023-01-22 ENCOUNTER — FORM ENCOUNTER (OUTPATIENT)
Age: 58
End: 2023-01-22

## 2023-01-25 ENCOUNTER — APPOINTMENT (OUTPATIENT)
Dept: ORTHOPEDIC SURGERY | Facility: CLINIC | Age: 58
End: 2023-01-25
Payer: OTHER MISCELLANEOUS

## 2023-01-25 VITALS — HEIGHT: 70 IN | WEIGHT: 230 LBS | BODY MASS INDEX: 32.93 KG/M2

## 2023-01-25 PROCEDURE — 99072 ADDL SUPL MATRL&STAF TM PHE: CPT

## 2023-01-25 PROCEDURE — 99213 OFFICE O/P EST LOW 20 MIN: CPT

## 2023-01-25 NOTE — HISTORY OF PRESENT ILLNESS
[Gradual] : gradual [8] : 8 [4] : 4 [Stabbing] : stabbing [Constant] : constant [Rest] : rest [Walking] : walking [de-identified] :  DOI 1/5/2021\par 1/27/21: bilateral knee made worse after new injury on 1/5/21.\par 2/24/21: bilateral knee pain persists.\par 3/24/21: follow up bilateral knees. reports to improvement with pt and injections. still having some mild symptoms but overall better.\par 5/5/21: bilateral knee pain still present. right worse than left today.\par 5/26/21: follow up bilateral knees. recent superficial lac on right knee from palm tree on 5/22/21 - received 14 sutures in er\par 6/23/21: bilateral knee pain still. superficial wound right medial knee doing well - sutures removed; cleared by pcp.\par 7/28/21: bilateral knee pain improved but still has some symptoms.\par 9/1/21: bilateral knee pain still present.\par 10/13/21: bilateral knee pain. just restarted pt.\par 11/24/21: bilateral knee pain persists. PT helps.\par 1/5/22: bilateral knee pain mildly improved but still present.\par 2/16/22: bilateral knee pain.\par 3/2/22: bilateral knee pain.\par 3/9/22: bilateral knee pain.\par 3/16/22: bilateral knee pain persists.\par 4/27/22:  follow up bilateral knees.  some relief with visco but does get flares up with weather changes and prolonged activities.  reports to tightness. \par 6/8/22:  left knee pain improved.  right knee pain persists.\par 6/15/22:  right knee pain continues despite csi on right knee last visit.  \par 7/13/22:  no approval for pt.  still intermittent episodes of pain. \par 8/24/22:  follow up bilateral knees.  still auth pending for pt.  continued tightness and stiffness in knees. \par 9/21/22:  bilateral knee pain stable.  improves with pt but no auth yet. \par 11/2/22:  bilateral knee pain still.  right knee worse.  no auth yet.\par 12/14/22:  follow up bilateral knees. \par 1/25/23:  csi for left knee last visit.  [FreeTextEntry3] : 1/5/21

## 2023-01-25 NOTE — ASSESSMENT
[FreeTextEntry1] : new injury on 1/5/21 when large monitor fell off wall at work and pinned her again the desk after pushing her back. history of knee scopes. worse pain after accident. \par \par mris 2021  suggest new tear left mm and oa exacerbation.   csi on 12/14/22.  \par right knee mri 2021 suggest oa exacerbation right knee with sprain.   csi on 11/2/22.\par \par has concussion symptoms as well which she is getting treatment for.\par scheduled for neck surgery on 8/2/21.

## 2023-01-25 NOTE — REVIEW OF SYSTEMS
[Joint Pain] : joint pain [Joint Swelling] : joint swelling [Negative] : Heme/Lymph [Headache] : headache

## 2023-01-25 NOTE — DISCUSSION/SUMMARY
[de-identified] : Progress note completed by Daphne Porter PA-C\par *Dr. Rosas - The [facility] assigned on this date saw this patient independently.  I have reviewed the note and agree with the treatment provided.

## 2023-02-22 ENCOUNTER — APPOINTMENT (OUTPATIENT)
Dept: ORTHOPEDIC SURGERY | Facility: CLINIC | Age: 58
End: 2023-02-22
Payer: OTHER MISCELLANEOUS

## 2023-02-22 VITALS — WEIGHT: 230 LBS | BODY MASS INDEX: 32.93 KG/M2 | HEIGHT: 70 IN

## 2023-02-22 PROCEDURE — 99072 ADDL SUPL MATRL&STAF TM PHE: CPT

## 2023-02-22 PROCEDURE — 99214 OFFICE O/P EST MOD 30 MIN: CPT | Mod: 25

## 2023-02-22 PROCEDURE — 73562 X-RAY EXAM OF KNEE 3: CPT | Mod: 50

## 2023-02-22 PROCEDURE — 20611 DRAIN/INJ JOINT/BURSA W/US: CPT

## 2023-02-22 PROCEDURE — J3490M: CUSTOM | Mod: RT

## 2023-02-22 NOTE — PROCEDURE
[Large Joint Injection] : Large joint injection [Right] : of the right [Knee] : knee [Pain] : pain [Alcohol] : alcohol [Betadine] : betadine [___ cc    3mg] :  Betamethasone (Celestone) ~Vcc of 3mg [___ cc    1%] : Lidocaine ~Vcc of 1%  [___ cc    0.25%] : Bupivacaine (Marcaine) ~Vcc of 0.25%  [] : Patient tolerated procedure well [Call if redness, pain or fever occur] : call if redness, pain or fever occur [Apply ice for 15min out of every hour for the next 12-24 hours as tolerated] : apply ice for 15 minutes out of every hour for the next 12-24 hours as tolerated [Patient was advised to rest the joint(s) for ____ days] : patient was advised to rest the joint(s) for [unfilled] days [Previous OTC use and PT nontherapeutic] : patient has tried OTC's including aspirin, Ibuprofen, Aleve, etc or prescription NSAIDS, and/or exercises at home and/or physical therapy without satisfactory response [Patient had decreased mobility in the joint] : patient had decreased mobility in the joint [Risks, benefits, alternatives discussed / Verbal consent obtained] : the risks benefits, and alternatives have been discussed, and verbal consent was obtained [Prior failure or difficult injection] : prior failure or difficult injection [All ultrasound images have been permanently captured and stored accordingly in our picture archiving and communication system] : All ultrasound images have been permanently captured and stored accordingly in our picture archiving and communication system [Visualization of the needle and placement of injection was performed without complication] : visualization of the needle and placement of injection was performed without complication [Inflammation] : inflammation

## 2023-02-22 NOTE — PHYSICAL EXAM
[Left] : left knee [NL (0)] : extension 0 degrees [5___] : hamstring 5[unfilled]/5 [Equivocal] : equivocal Ashley [4___] : quadriceps 4[unfilled]/5 [Right] : right knee [There are no fractures, subluxations or dislocations. No significant abnormalities are seen] : There are no fractures, subluxations or dislocations. No significant abnormalities are seen [] : no erythema [Degenerative change] : Degenerative change [TWNoteComboBox7] : flexion 125 degrees

## 2023-02-22 NOTE — ASSESSMENT
[FreeTextEntry1] : new injury on 1/5/21 when large monitor fell off wall at work and pinned her again the desk after pushing her back. history of knee scopes. worse pain after accident.   new fall on feb 10 when she got dizzy.  \par \par mris 2021  suggest new tear left mm and oa exacerbation.   csi on 12/14/22.  no new acute fracture seen.\par   \par right knee mri 2021 suggest oa exacerbation right knee with sprain.   csi on 11/2/22.  recurrent pain present. no new acute fracture seen.\par \par has concussion symptoms as well which she is getting treatment for.  patient also notes she has gotten dizzy at least 2 times and had fall in july 2022 and feb 2022 which has caused her to fall.  I have encouraged her to treat with her neurologist or PCP to get a proper work up for this dizziness.  of note, patient denies having these injuries before the neurologic symptoms she developed from her injury on 1/5/21.  \par \par

## 2023-02-22 NOTE — HISTORY OF PRESENT ILLNESS
[Work related] : work related [9] : 9 [Stabbing] : stabbing [Frequent] : frequent [Rest] : rest [de-identified] : WC DOI 1/5/2021\par 1/27/21: bilateral knee made worse after new injury on 1/5/21.\par 2/24/21: bilateral knee pain persists.\par 3/24/21: follow up bilateral knees. reports to improvement with pt and injections. still having some mild symptoms but overall better.\par 5/5/21: bilateral knee pain still present. right worse than left today.\par 5/26/21: follow up bilateral knees. recent superficial lac on right knee from palm tree on 5/22/21 - received 14 sutures in er\par 6/23/21: bilateral knee pain still. superficial wound right medial knee doing well - sutures removed; cleared by pcp.\par 7/28/21: bilateral knee pain improved but still has some symptoms.\par 9/1/21: bilateral knee pain still present.\par 10/13/21: bilateral knee pain. just restarted pt.\par 11/24/21: bilateral knee pain persists. PT helps.\par 1/5/22: bilateral knee pain mildly improved but still present.\par 2/16/22: bilateral knee pain.\par 3/2/22: bilateral knee pain.\par 3/9/22: bilateral knee pain.\par 3/16/22: bilateral knee pain persists.\par 4/27/22:  follow up bilateral knees.  some relief with visco but does get flares up with weather changes and prolonged activities.  reports to tightness. \par 6/8/22:  left knee pain improved.  right knee pain persists.\par 6/15/22:  right knee pain continues despite csi on right knee last visit.  \par 7/13/22:  no approval for pt.  still intermittent episodes of pain. \par 8/24/22:  follow up bilateral knees.  still auth pending for pt.  continued tightness and stiffness in knees. \par 9/21/22:  bilateral knee pain stable.  improves with pt but no auth yet. \par 11/2/22:  bilateral knee pain still.  right knee worse.  no auth yet.\par 12/14/22:  follow up bilateral knees. \par 1/25/23:  csi for left knee last visit. \par 2/22/23:  follow up bilateral knees.  increased pain after a fall forward on 2/10/22 after getting dizzy.  patient notes she also fell in july 17 2022.  both times she felt dizzy. [FreeTextEntry3] : 1/5/21 [de-identified] : walking

## 2023-02-22 NOTE — REVIEW OF SYSTEMS
[Joint Pain] : joint pain [Negative] : Heme/Lymph [Joint Swelling] : joint swelling [Headache] : headache

## 2023-02-22 NOTE — DISCUSSION/SUMMARY
[de-identified] : Progress note completed by Daphne Porter PA-C\par *Dr. Rosas - The [facility] assigned on this date saw this patient independently.  I have reviewed the note and agree with the treatment provided.

## 2023-02-22 NOTE — WORK
appears WFL [Total] : total [Does not reveal pre-existing condition(s) that may affect treatment/prognosis] : does not reveal pre-existing condition(s) that may affect treatment/prognosis [Cannot return to work because ________] : cannot return to work because [unfilled] [Bending/Twisting] : bending/twisting [Climbing stairs/Ladders] : climbing stairs/ladders [Kneeling] : kneeling [Walking] : walking [Standing] : standing [Patient] : patient [No Rx restrictions] : No Rx restrictions. [I provided the services listed above] :  I provided the services listed above. 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

## 2023-03-08 ENCOUNTER — APPOINTMENT (OUTPATIENT)
Dept: UROLOGY | Facility: CLINIC | Age: 58
End: 2023-03-08
Payer: COMMERCIAL

## 2023-03-08 VITALS
HEIGHT: 70 IN | BODY MASS INDEX: 32.93 KG/M2 | OXYGEN SATURATION: 100 % | HEART RATE: 85 BPM | WEIGHT: 230 LBS | SYSTOLIC BLOOD PRESSURE: 123 MMHG | DIASTOLIC BLOOD PRESSURE: 85 MMHG | RESPIRATION RATE: 16 BRPM | TEMPERATURE: 98.5 F

## 2023-03-08 PROCEDURE — 99213 OFFICE O/P EST LOW 20 MIN: CPT

## 2023-03-08 RX ORDER — GABAPENTIN 300 MG/1
300 CAPSULE ORAL
Qty: 30 | Refills: 0 | Status: ACTIVE | COMMUNITY
Start: 2023-03-08

## 2023-03-08 RX ORDER — CHLORHEXIDINE GLUCONATE 4 %
LIQUID (ML) TOPICAL DAILY
Qty: 30 | Refills: 0 | Status: ACTIVE | COMMUNITY
Start: 2023-03-08

## 2023-03-08 RX ORDER — NICOTINE POLACRILEX 2 MG
1 GUM BUCCAL
Qty: 30 | Refills: 0 | Status: ACTIVE | COMMUNITY
Start: 2023-03-08

## 2023-03-08 RX ORDER — BACLOFEN 10 MG/1
10 TABLET ORAL 3 TIMES DAILY
Qty: 90 | Refills: 0 | Status: ACTIVE | COMMUNITY
Start: 2023-03-08

## 2023-03-08 RX ORDER — VORTIOXETINE 10 MG/1
10 TABLET, FILM COATED ORAL
Qty: 60 | Refills: 0 | Status: ACTIVE | COMMUNITY
Start: 2023-03-08

## 2023-03-08 RX ORDER — FERROUS SULFATE TAB EC 325 MG (65 MG FE EQUIVALENT) 325 (65 FE) MG
325 (65 FE) TABLET DELAYED RESPONSE ORAL DAILY
Qty: 30 | Refills: 0 | Status: ACTIVE | COMMUNITY
Start: 2023-03-08

## 2023-03-08 NOTE — HISTORY OF PRESENT ILLNESS
[FreeTextEntry1] : T he patient has OAB.  She has some urgency.  Leaks with cough or sneeze.  wears liner per day.  Nocturia X 1.

## 2023-03-08 NOTE — ASSESSMENT
[FreeTextEntry1] : mixed urge and stress incontinence. \par \par Plan:\par \par continue solifenacin\par offered biofeedback. \par follow up 6 months. \par \par

## 2023-03-08 NOTE — LETTER BODY
[Dear  ___] : Dear  [unfilled], [Courtesy Letter:] : I had the pleasure of seeing your patient, [unfilled], in my office today. [Please see my note below.] : Please see my note below. [Sincerely,] : Sincerely, [FreeTextEntry3] : Ed\par \par Ridge Cisneros MD\par [facility]\par  of Urology\par [employer] [facility]\par

## 2023-03-16 ENCOUNTER — APPOINTMENT (OUTPATIENT)
Dept: THORACIC SURGERY | Facility: CLINIC | Age: 58
End: 2023-03-16
Payer: COMMERCIAL

## 2023-03-16 VITALS
RESPIRATION RATE: 16 BRPM | OXYGEN SATURATION: 99 % | HEART RATE: 91 BPM | SYSTOLIC BLOOD PRESSURE: 127 MMHG | HEIGHT: 70 IN | DIASTOLIC BLOOD PRESSURE: 86 MMHG

## 2023-03-16 DIAGNOSIS — J90 PLEURAL EFFUSION, NOT ELSEWHERE CLASSIFIED: ICD-10-CM

## 2023-03-16 DIAGNOSIS — H81.90 UNSPECIFIED DISORDER OF VESTIBULAR FUNCTION, UNSPECIFIED EAR: ICD-10-CM

## 2023-03-16 PROCEDURE — 99204 OFFICE O/P NEW MOD 45 MIN: CPT

## 2023-03-16 RX ORDER — CLONAZEPAM 1 MG/1
1 TABLET ORAL
Qty: 45 | Refills: 0 | Status: COMPLETED | COMMUNITY
Start: 2022-07-22 | End: 2023-03-16

## 2023-03-16 RX ORDER — ALPRAZOLAM 2 MG/1
TABLET ORAL
Refills: 0 | Status: ACTIVE | COMMUNITY

## 2023-03-17 NOTE — HISTORY OF PRESENT ILLNESS
[FreeTextEntry1] : Ms. HENDRICKS is a 58 year old female referred by Dr. Renae Nagy who presents for consultation. Her past medical history includes \par \par She was sleeping in a chair and she got up to use the bathroom, became dizzy and fell. She sustained a trauma to the chest and has not been able to lie flat for 3 weeks. She is now able to lie flat and is improving. She has imaging revealing small effusions and atelectasis. Her pulmonologist Dr Nagy sent her for possible thoracentesis. \par

## 2023-03-17 NOTE — ASSESSMENT
[FreeTextEntry1] : Ms Banda presents to the office to discuss recent imaging with small pleural effusions. She is currently asymptomatic and denies any shortness of breath. She struggles with discomfort from the rib trauma however no intervention is warranted for the small effusion noted on CT imaging. She will return to care on an as needed basis.\par \par \par PLAN:\par - Return to care as needed \par \par \par \par \par I, Dr. Spencer, personally performed the evaluation and management (E/M) services for this new patient.  That E/M includes conducting the initial examination, assessing all conditions, and establishing the plan of care.  Today, my ACP, Jorge Martin NP was here to observe my evaluation and management services for this patient to be followed going forward.\par \par \par

## 2023-03-17 NOTE — REVIEW OF SYSTEMS
[Feeling Poorly] : not feeling poorly [Feeling Tired] : not feeling tired [Chest Pain] : no chest pain [Palpitations] : no palpitations [Negative] : Heme/Lymph

## 2023-03-17 NOTE — PHYSICAL EXAM
[General Appearance - Well Nourished] : well nourished [General Appearance - Well Developed] : well developed [Sclera] : the sclera and conjunctiva were normal [Outer Ear] : the ears and nose were normal in appearance [Neck Appearance] : the appearance of the neck was normal [Respiration, Rhythm And Depth] : normal respiratory rhythm and effort [Heart Rate And Rhythm] : heart rate was normal and rhythm regular [Abnormal Walk] : normal gait [Skin Color & Pigmentation] : normal skin color and pigmentation [Sensation] : the sensory exam was normal to light touch and pinprick [Oriented To Time, Place, And Person] : oriented to person, place, and time

## 2023-04-05 ENCOUNTER — APPOINTMENT (OUTPATIENT)
Dept: ORTHOPEDIC SURGERY | Facility: CLINIC | Age: 58
End: 2023-04-05
Payer: OTHER MISCELLANEOUS

## 2023-04-05 VITALS — BODY MASS INDEX: 32.93 KG/M2 | HEIGHT: 70 IN | WEIGHT: 230 LBS

## 2023-04-05 PROCEDURE — 99213 OFFICE O/P EST LOW 20 MIN: CPT

## 2023-04-05 NOTE — HISTORY OF PRESENT ILLNESS
[Work related] : work related [4] : 4 [Dull/Aching] : dull/aching [de-identified] : WC DOI 1/5/2021\par 1/27/21: bilateral knee made worse after new injury on 1/5/21.\par 2/24/21: bilateral knee pain persists.\par 3/24/21: follow up bilateral knees. reports to improvement with pt and injections. still having some mild symptoms but overall better.\par 5/5/21: bilateral knee pain still present. right worse than left today.\par 5/26/21: follow up bilateral knees. recent superficial lac on right knee from palm tree on 5/22/21 - received 14 sutures in er\par 6/23/21: bilateral knee pain still. superficial wound right medial knee doing well - sutures removed; cleared by pcp.\par 7/28/21: bilateral knee pain improved but still has some symptoms.\par 9/1/21: bilateral knee pain still present.\par 10/13/21: bilateral knee pain. just restarted pt.\par 11/24/21: bilateral knee pain persists. PT helps.\par 1/5/22: bilateral knee pain mildly improved but still present.\par 2/16/22: bilateral knee pain.\par 3/2/22: bilateral knee pain.\par 3/9/22: bilateral knee pain.\par 3/16/22: bilateral knee pain persists.\par 4/27/22:  follow up bilateral knees.  some relief with visco but does get flares up with weather changes and prolonged activities.  reports to tightness. \par 6/8/22:  left knee pain improved.  right knee pain persists.\par 6/15/22:  right knee pain continues despite csi on right knee last visit.  \par 7/13/22:  no approval for pt.  still intermittent episodes of pain. \par 8/24/22:  follow up bilateral knees.  still auth pending for pt.  continued tightness and stiffness in knees. \par 9/21/22:  bilateral knee pain stable.  improves with pt but no auth yet. \par 11/2/22:  bilateral knee pain still.  right knee worse.  no auth yet.\par 12/14/22:  follow up bilateral knees. \par 1/25/23:  csi for left knee last visit. \par 2/22/23:  follow up bilateral knees.  increased pain after a fall forward on 2/10/22 after getting dizzy.  patient notes she also fell in july 17 2022.  both times she felt dizzy.\par 4/5/23:  stable knee pain.  mild tightness and stiffness which is intermittent.  reports the csi for right knee did give her relief.  [FreeTextEntry3] : 1/5/21

## 2023-04-05 NOTE — REVIEW OF SYSTEMS
[Joint Pain] : joint pain [Joint Stiffness] : joint stiffness [Negative] : Heme/Lymph [Joint Swelling] : joint swelling [Headache] : headache

## 2023-04-05 NOTE — DISCUSSION/SUMMARY
[de-identified] : Progress note completed by Daphne Porter PA-C\par *Dr. Rosas - The [facility] assigned on this date saw this patient independently.  I have reviewed the note and agree with the treatment provided.

## 2023-04-05 NOTE — ASSESSMENT
[FreeTextEntry1] : new injury on 1/5/21 when large monitor fell off wall at work and pinned her again the desk after pushing her back. history of knee scopes.\par \par left knee mri 2021 suggests new tear left mm and oa exacerbation.   csi on 12/14/22.  \par   \par right knee mri 2021 suggest oa exacerbation right knee with sprain.   csi on 2/22/23\par \par has concussion symptoms as well which she is getting treatment for.  patient also notes she has gotten dizzy at least 2 times and had fall in july 2022 and feb 2022 which has caused her to fall.  I have encouraged her to treat with her neurologist or PCP to get a proper work up for this dizziness.  of note, patient denies having these injuries before the neurologic symptoms she developed from her injury on 1/5/21.  \par \par

## 2023-04-05 NOTE — WORK
[Does not reveal pre-existing condition(s) that may affect treatment/prognosis] : does not reveal pre-existing condition(s) that may affect treatment/prognosis [Cannot return to work because ________] : cannot return to work because [unfilled] [Bending/Twisting] : bending/twisting [Climbing stairs/Ladders] : climbing stairs/ladders [Kneeling] : kneeling [Walking] : walking [Standing] : standing [Patient] : patient [No Rx restrictions] : No Rx restrictions. [I provided the services listed above] :  I provided the services listed above. 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

## 2023-04-05 NOTE — PHYSICAL EXAM
[4___] : quadriceps 4[unfilled]/5 [Left] : left knee [NL (0)] : extension 0 degrees [5___] : hamstring 5[unfilled]/5 [Equivocal] : equivocal Ashley [Right] : right knee [There are no fractures, subluxations or dislocations. No significant abnormalities are seen] : There are no fractures, subluxations or dislocations. No significant abnormalities are seen [Degenerative change] : Degenerative change [] : no erythema [TWNoteComboBox7] : flexion 125 degrees

## 2023-05-17 ENCOUNTER — APPOINTMENT (OUTPATIENT)
Dept: ORTHOPEDIC SURGERY | Facility: CLINIC | Age: 58
End: 2023-05-17
Payer: OTHER MISCELLANEOUS

## 2023-05-17 VITALS — WEIGHT: 230 LBS | BODY MASS INDEX: 32.93 KG/M2 | HEIGHT: 70 IN

## 2023-05-17 PROCEDURE — 20611 DRAIN/INJ JOINT/BURSA W/US: CPT | Mod: RT

## 2023-05-17 PROCEDURE — 99214 OFFICE O/P EST MOD 30 MIN: CPT | Mod: 25

## 2023-05-17 PROCEDURE — J3490M: CUSTOM | Mod: RT

## 2023-05-17 NOTE — PHYSICAL EXAM
[4___] : quadriceps 4[unfilled]/5 [Left] : left knee [NL (0)] : extension 0 degrees [5___] : hamstring 5[unfilled]/5 [Equivocal] : equivocal Ashley [Right] : right knee [There are no fractures, subluxations or dislocations. No significant abnormalities are seen] : There are no fractures, subluxations or dislocations. No significant abnormalities are seen [Degenerative change] : Degenerative change [] : no erythema [FreeTextEntry8] : still [TWNoteComboBox7] : flexion 125 degrees

## 2023-05-17 NOTE — PROCEDURE
[FreeTextEntry3] : Procedure Name: Large Joint Injection / Aspiration: Celestone, Lidocaine and Marcaine with Ultrasound Evaluation and Guidance\par \par Large Joint Injection was performed because of pain and inflammation. Anesthesia: ethyl chloride sprayed topically.\par Celestone: An injection of Celestone 6 mg , 1 cc.\par Lidocaine 1%: 3 cc.\par Marcaine 0.25%:  3 cc.\par \par Patient has tried OTC's including aspirin, Ibuprofen, Aleve etc or prescription NSAIDS, and/or exercises at home and/ or physical therapy without satisfactory response and Patient has decreased mobility in the joint. The risks, benefits, and alternatives to cortisone injection were explained in full to the patient. Risks outlined include but are not limited to infection, sepsis, bleeding, scarring, skin discoloration, temporary increase in pain, syncopal episode, failure to resolve symptoms, allergic reaction, symptom recurrence, and elevation of blood sugar in diabetics. Patient understood the risks. All questions were answered.   Oral informed consent was obtained.   Sterile technique was utilized for the procedure including the preparation of the solutions used for the injection. Medication was injected into RIGHT KNEE.   Patient tolerated the procedure well. Advised to ice the injection site this evening.  Post Procedure Instructions: Patient was advised to call if redness, pain, or fever occur and apply ice for 15 min. out of every hour for the next 12-24 hours as tolerated. patient was advised to rest the joint(s) for 2 days. \par \par Ultrasound Extremity (32545) was used because of the following reasons: inflammation.\par Ultrasound Guidance was used for the following reasons: for accurate placement of needle into knee joint\par Diagnostic ultrasound was performed of the knee and is positive for synovitis.\par Ultrasound guided injection was performed of the knee, visualization of the needle and placement of injection was performed without complication.\par \par

## 2023-05-17 NOTE — DISCUSSION/SUMMARY
[de-identified] : Progress note completed by Daphne Porter PA-C\par *Dr. Rosas - The [facility] assigned on this date saw this patient independently.  I have reviewed the note and agree with the treatment provided.

## 2023-05-17 NOTE — REVIEW OF SYSTEMS
[Joint Pain] : joint pain [Joint Stiffness] : joint stiffness [Headache] : headache [Joint Swelling] : joint swelling [Negative] : Heme/Lymph

## 2023-05-17 NOTE — ASSESSMENT
[FreeTextEntry1] : new injury on 1/5/21 when large monitor fell off wall at work and pinned her again the desk after pushing her back.  history of knee scopes.\par \par left knee mri 2021 suggests new tear left mm and oa exacerbation.   csi on 12/14/22. \par   \par right knee mri 2021 suggest oa exacerbation right knee with sprain.   csi on 2/22/23.   \par \par has concussion symptoms as well which she is getting treatment for.  patient also notes she has gotten dizzy at least 2 times and had fall in july 2022 and feb 2022 which has caused her to fall. seeing neuro\par \par

## 2023-05-17 NOTE — HISTORY OF PRESENT ILLNESS
[Work related] : work related [7] : 7 [2] : 2 [Dull/Aching] : dull/aching [Stabbing] : stabbing [Throbbing] : throbbing [Meds] : meds [Heat] : heat [Not working due to injury] : Work status: not working due to injury [de-identified] : WC DOI 1/5/2021\par 1/27/21: bilateral knee made worse after new injury on 1/5/21.\par 2/24/21: bilateral knee pain persists.\par 3/24/21: follow up bilateral knees. reports to improvement with pt and injections. still having some mild symptoms but overall better.\par 5/5/21: bilateral knee pain still present. right worse than left today.\par 5/26/21: follow up bilateral knees. recent superficial lac on right knee from [place] on 5/22/21 - received 14 sutures in er\par 6/23/21: bilateral knee pain still. superficial wound right medial knee doing well - sutures removed; cleared by pcp.\par 7/28/21: bilateral knee pain improved but still has some symptoms.\par 9/1/21: bilateral knee pain still present.\par 10/13/21: bilateral knee pain. just restarted pt.\par 11/24/21: bilateral knee pain persists. PT helps.\par 1/5/22: bilateral knee pain mildly improved but still present.\par 2/16/22: bilateral knee pain.\par 3/2/22: bilateral knee pain.\par 3/9/22: bilateral knee pain.\par 3/16/22: bilateral knee pain persists.\par 4/27/22:  follow up bilateral knees.  some relief with visco but does get flares up with weather changes and prolonged activities.  reports to tightness. \par 6/8/22:  left knee pain improved.  right knee pain persists.\par 6/15/22:  right knee pain continues despite csi on right knee last visit.  \par 7/13/22:  no approval for pt.  still intermittent episodes of pain. \par 8/24/22:  follow up bilateral knees.  still auth pending for pt.  continued tightness and stiffness in knees. \par 9/21/22:  bilateral knee pain stable.  improves with pt but no auth yet. \par 11/2/22:  bilateral knee pain still.  right knee worse.  no auth yet.\par 12/14/22:  follow up bilateral knees. \par 1/25/23:  csi for left knee last visit. \par 2/22/23:  follow up bilateral knees.  increased pain after a fall forward on 2/10/22 after getting dizzy.  patient notes she also fell in july 17 2022.  both times she felt dizzy.\par 4/5/23:  stable knee pain.  mild tightness and stiffness which is intermittent.  reports the csi for right knee did give her relief. \par 5/17/23:  follow up bilateral knees.  still having pain kati with weather changes.  right knee worse than left today.  [] : no [FreeTextEntry1] : BL KNEES [FreeTextEntry3] : 1/5/21 [de-identified] : PT has not been approved

## 2023-05-31 ENCOUNTER — APPOINTMENT (OUTPATIENT)
Dept: ORTHOPEDIC SURGERY | Facility: CLINIC | Age: 58
End: 2023-05-31
Payer: OTHER MISCELLANEOUS

## 2023-05-31 VITALS — BODY MASS INDEX: 32.93 KG/M2 | HEIGHT: 70 IN | WEIGHT: 230 LBS

## 2023-05-31 PROCEDURE — 20611 DRAIN/INJ JOINT/BURSA W/US: CPT | Mod: 50

## 2023-05-31 PROCEDURE — 99214 OFFICE O/P EST MOD 30 MIN: CPT | Mod: 25

## 2023-05-31 NOTE — PROCEDURE
[FreeTextEntry3] : Procedure Name: Orthovisc (Large Joint) with Ultrasound Guidance\par \par Viscosupplementation Injection: X-ray evidence of Osteoarthritis on this or prior visit and Patient has tried OTC's including aspirin, Ibuprofen, Aleve etc or prescription NSAIDS, and/or exercises at home and/ or physical therapy without satisfactory response.  The risks, benefits, and alternatives to Viscosupplementation injection were explained in full to the patient. Risks outlined include but are not limited to infection, sepsis, bleeding, scarring, skin discoloration, temporary increase in pain, syncopal episode, failure to resolve symptoms, allergic reaction, and symptom recurrence. Signs and symptoms of infection reviewed and patient advised to call immediately for redness, fevers, and/or chills. Patient understood the risks. All questions were answered. \par \par Oral informed consent was obtained.   Sterile technique was utilized for the procedure including the preparation of the solutions used for the injection. An injection of Orthovisc 2ml #1 was injected into BILATERAL KNEES.  Patient tolerated the procedure well. Advised to ice the injection site this evening.  Post Procedure Instructions: Patient was advised to call if redness, pain, or fever occur and apply ice for 15 min. out of every hour for the next 12-24 hours as tolerated. patient was advised to rest the joint(s) for 2 days. \par \par Ultrasound Extremity (31233) was used because of the following reasons: inflammation.\par Ultrasound Guidance was used for the following reasons: for accurate placement of needle into knee joint\par Diagnostic ultrasound was performed of the knee and is positive for synovitis.\par Ultrasound guided injection was performed of the knee, visualization of the needle and placement of injection was performed without complication.

## 2023-05-31 NOTE — PHYSICAL EXAM
[4___] : quadriceps 4[unfilled]/5 [Left] : left knee [NL (0)] : extension 0 degrees [5___] : hamstring 5[unfilled]/5 [Equivocal] : equivocal Ashley [] : patient ambulates without assistive device [Right] : right knee [Degenerative change] : Degenerative change [There are no fractures, subluxations or dislocations. No significant abnormalities are seen] : There are no fractures, subluxations or dislocations. No significant abnormalities are seen [FreeTextEntry8] : still [TWNoteComboBox7] : flexion 125 degrees

## 2023-05-31 NOTE — ASSESSMENT
[FreeTextEntry1] : new injury on 1/5/21 when large monitor fell off wall at work and pinned her again the desk after pushing her back.  history of knee scopes.\par \par left knee mri 2021 suggests new tear left mm and oa exacerbation.   csi on 12/14/22. \par   \par right knee mri 2021 suggest oa exacerbation right knee with sprain.   csi on 5/17/23.   \par \par has concussion symptoms as well which she is getting treatment for.  patient also notes she has gotten dizzy at least 2 times and had fall in july 2022 and feb 2022 which has caused her to fall. seeing neuro\par \par

## 2023-05-31 NOTE — HISTORY OF PRESENT ILLNESS
[de-identified] : WC DOI 1/5/2021\par 1/27/21: bilateral knee made worse after new injury on 1/5/21.\par 2/24/21: bilateral knee pain persists.\par 3/24/21: follow up bilateral knees. reports to improvement with pt and injections. still having some mild symptoms but overall better.\par 5/5/21: bilateral knee pain still present. right worse than left today.\par 5/26/21: follow up bilateral knees. recent superficial lac on right knee from [place] on 5/22/21 - received 14 sutures in er\par 6/23/21: bilateral knee pain still. superficial wound right medial knee doing well - sutures removed; cleared by pcp.\par 7/28/21: bilateral knee pain improved but still has some symptoms.\par 9/1/21: bilateral knee pain still present.\par 10/13/21: bilateral knee pain. just restarted pt.\par 11/24/21: bilateral knee pain persists. PT helps.\par 1/5/22: bilateral knee pain mildly improved but still present.\par 2/16/22: bilateral knee pain.\par 3/2/22: bilateral knee pain.\par 3/9/22: bilateral knee pain.\par 3/16/22: bilateral knee pain persists.\par 4/27/22:  follow up bilateral knees.  some relief with visco but does get flares up with weather changes and prolonged activities.  reports to tightness. \par 6/8/22:  left knee pain improved.  right knee pain persists.\par 6/15/22:  right knee pain continues despite csi on right knee last visit.  \par 7/13/22:  no approval for pt.  still intermittent episodes of pain. \par 8/24/22:  follow up bilateral knees.  still auth pending for pt.  continued tightness and stiffness in knees. \par 9/21/22:  bilateral knee pain stable.  improves with pt but no auth yet. \par 11/2/22:  bilateral knee pain still.  right knee worse.  no auth yet.\par 12/14/22:  follow up bilateral knees. \par 1/25/23:  csi for left knee last visit. \par 2/22/23:  follow up bilateral knees.  increased pain after a fall forward on 2/10/22 after getting dizzy.  patient notes she also fell in july 17 2022.  both times she felt dizzy.\par 4/5/23:  stable knee pain.  mild tightness and stiffness which is intermittent.  reports the csi for right knee did give her relief. \par 5/17/23:  follow up bilateral knees.  still having pain kati with weather changes.  right knee worse than left today. \par 5/31/23:  bilateral knee pain.

## 2023-06-07 ENCOUNTER — APPOINTMENT (OUTPATIENT)
Dept: ORTHOPEDIC SURGERY | Facility: CLINIC | Age: 58
End: 2023-06-07
Payer: OTHER MISCELLANEOUS

## 2023-06-07 VITALS — HEIGHT: 70 IN | WEIGHT: 230 LBS | BODY MASS INDEX: 32.93 KG/M2

## 2023-06-07 PROCEDURE — 20611 DRAIN/INJ JOINT/BURSA W/US: CPT | Mod: 50

## 2023-06-07 PROCEDURE — 99212 OFFICE O/P EST SF 10 MIN: CPT | Mod: 25

## 2023-06-07 NOTE — PROCEDURE
[FreeTextEntry3] : Procedure Name: Orthovisc (Large Joint) with Ultrasound Guidance\par \par Viscosupplementation Injection: X-ray evidence of Osteoarthritis on this or prior visit and Patient has tried OTC's including aspirin, Ibuprofen, Aleve etc or prescription NSAIDS, and/or exercises at home and/ or physical therapy without satisfactory response.  The risks, benefits, and alternatives to Viscosupplementation injection were explained in full to the patient. Risks outlined include but are not limited to infection, sepsis, bleeding, scarring, skin discoloration, temporary increase in pain, syncopal episode, failure to resolve symptoms, allergic reaction, and symptom recurrence. Signs and symptoms of infection reviewed and patient advised to call immediately for redness, fevers, and/or chills. Patient understood the risks. All questions were answered. \par \par Oral informed consent was obtained.   Sterile technique was utilized for the procedure including the preparation of the solutions used for the injection. An injection of Orthovisc 2ml #2 was injected into BILATERAL KNEES.  Patient tolerated the procedure well. Advised to ice the injection site this evening.  Post Procedure Instructions: Patient was advised to call if redness, pain, or fever occur and apply ice for 15 min. out of every hour for the next 12-24 hours as tolerated. patient was advised to rest the joint(s) for 2 days. \par \par Ultrasound Extremity (45207) was used because of the following reasons: inflammation.\par Ultrasound Guidance was used for the following reasons: for accurate placement of needle into knee joint\par Diagnostic ultrasound was performed of the knee and is positive for synovitis.\par Ultrasound guided injection was performed of the knee, visualization of the needle and placement of injection was performed without complication.

## 2023-06-07 NOTE — DISCUSSION/SUMMARY
[de-identified] : Progress Note completed by Daphne Porter PA-C\par * Dr. Rosas -- The documentation recorded in this note accurately reflects the decisions made by me during this visit.

## 2023-06-07 NOTE — HISTORY OF PRESENT ILLNESS
[Work related] : work related [2] : 2 [Orthovisc] : Orthovisc [8] : 8 [4] : 4 [de-identified] : WC DOI 1/5/2021\par 1/27/21: bilateral knee made worse after new injury on 1/5/21.\par 2/24/21: bilateral knee pain persists.\par 3/24/21: follow up bilateral knees. reports to improvement with pt and injections. still having some mild symptoms but overall better.\par 5/5/21: bilateral knee pain still present. right worse than left today.\par 5/26/21: follow up bilateral knees. recent superficial lac on right knee from [place] on 5/22/21 - received 14 sutures in er\par 6/23/21: bilateral knee pain still. superficial wound right medial knee doing well - sutures removed; cleared by pcp.\par 7/28/21: bilateral knee pain improved but still has some symptoms.\par 9/1/21: bilateral knee pain still present.\par 10/13/21: bilateral knee pain. just restarted pt.\par 11/24/21: bilateral knee pain persists. PT helps.\par 1/5/22: bilateral knee pain mildly improved but still present.\par 2/16/22: bilateral knee pain.\par 3/2/22: bilateral knee pain.\par 3/9/22: bilateral knee pain.\par 3/16/22: bilateral knee pain persists.\par 4/27/22:  follow up bilateral knees.  some relief with visco but does get flares up with weather changes and prolonged activities.  reports to tightness. \par 6/8/22:  left knee pain improved.  right knee pain persists.\par 6/15/22:  right knee pain continues despite csi on right knee last visit.  \par 7/13/22:  no approval for pt.  still intermittent episodes of pain. \par 8/24/22:  follow up bilateral knees.  still auth pending for pt.  continued tightness and stiffness in knees. \par 9/21/22:  bilateral knee pain stable.  improves with pt but no auth yet. \par 11/2/22:  bilateral knee pain still.  right knee worse.  no auth yet.\par 12/14/22:  follow up bilateral knees. \par 1/25/23:  csi for left knee last visit. \par 2/22/23:  follow up bilateral knees.  increased pain after a fall forward on 2/10/22 after getting dizzy.  patient notes she also fell in july 17 2022.  both times she felt dizzy.\par 4/5/23:  stable knee pain.  mild tightness and stiffness which is intermittent.  reports the csi for right knee did give her relief. \par 5/17/23:  follow up bilateral knees.  still having pain kati with weather changes.  right knee worse than left today. \par 5/31/23:  bilateral knee pain. \par 6/7/23:  bilateral knee pain.  no adverse reactions from first injections.  [FreeTextEntry1] : b/l knees [de-identified] : b/l knees [[facility]

## 2023-06-07 NOTE — PHYSICAL EXAM
[4___] : quadriceps 4[unfilled]/5 [Left] : left knee [NL (0)] : extension 0 degrees [5___] : hamstring 5[unfilled]/5 [Equivocal] : equivocal Ashley [] : patient ambulates without assistive device [Right] : right knee [There are no fractures, subluxations or dislocations. No significant abnormalities are seen] : There are no fractures, subluxations or dislocations. No significant abnormalities are seen [Degenerative change] : Degenerative change [FreeTextEntry8] : still [TWNoteComboBox7] : flexion 125 degrees

## 2023-06-14 ENCOUNTER — APPOINTMENT (OUTPATIENT)
Dept: ORTHOPEDIC SURGERY | Facility: CLINIC | Age: 58
End: 2023-06-14
Payer: OTHER MISCELLANEOUS

## 2023-06-14 VITALS — HEIGHT: 70 IN | BODY MASS INDEX: 32.93 KG/M2 | WEIGHT: 230 LBS

## 2023-06-14 PROCEDURE — 99212 OFFICE O/P EST SF 10 MIN: CPT | Mod: 25

## 2023-06-14 PROCEDURE — 20611 DRAIN/INJ JOINT/BURSA W/US: CPT | Mod: 50

## 2023-06-14 NOTE — PROCEDURE
[FreeTextEntry3] : Procedure Name: Orthovisc (Large Joint) with Ultrasound Guidance\par \par Viscosupplementation Injection: X-ray evidence of Osteoarthritis on this or prior visit and Patient has tried OTC's including aspirin, Ibuprofen, Aleve etc or prescription NSAIDS, and/or exercises at home and/ or physical therapy without satisfactory response.  The risks, benefits, and alternatives to Viscosupplementation injection were explained in full to the patient. Risks outlined include but are not limited to infection, sepsis, bleeding, scarring, skin discoloration, temporary increase in pain, syncopal episode, failure to resolve symptoms, allergic reaction, and symptom recurrence. Signs and symptoms of infection reviewed and patient advised to call immediately for redness, fevers, and/or chills. Patient understood the risks. All questions were answered. \par \par Oral informed consent was obtained.   Sterile technique was utilized for the procedure including the preparation of the solutions used for the injection. An injection of Orthovisc 2ml #3 was injected into BILATERAL KNEES.  Patient tolerated the procedure well. Advised to ice the injection site this evening.  Post Procedure Instructions: Patient was advised to call if redness, pain, or fever occur and apply ice for 15 min. out of every hour for the next 12-24 hours as tolerated. patient was advised to rest the joint(s) for 2 days. \par \par Ultrasound Extremity (47026) was used because of the following reasons: inflammation.\par Ultrasound Guidance was used for the following reasons: for accurate placement of needle into knee joint\par Diagnostic ultrasound was performed of the knee and is positive for synovitis.\par Ultrasound guided injection was performed of the knee, visualization of the needle and placement of injection was performed without complication.

## 2023-06-14 NOTE — HISTORY OF PRESENT ILLNESS
[Work related] : work related [3] : 3 [Orthovisc] : Orthovisc [de-identified] : WC DOI 1/5/2021\par 1/27/21: bilateral knee made worse after new injury on 1/5/21.\par 2/24/21: bilateral knee pain persists.\par 3/24/21: follow up bilateral knees. reports to improvement with pt and injections. still having some mild symptoms but overall better.\par 5/5/21: bilateral knee pain still present. right worse than left today.\par 5/26/21: follow up bilateral knees. recent superficial lac on right knee from [place] on 5/22/21 - received 14 sutures in er\par 6/23/21: bilateral knee pain still. superficial wound right medial knee doing well - sutures removed; cleared by pcp.\par 7/28/21: bilateral knee pain improved but still has some symptoms.\par 9/1/21: bilateral knee pain still present.\par 10/13/21: bilateral knee pain. just restarted pt.\par 11/24/21: bilateral knee pain persists. PT helps.\par 1/5/22: bilateral knee pain mildly improved but still present.\par 2/16/22: bilateral knee pain.\par 3/2/22: bilateral knee pain.\par 3/9/22: bilateral knee pain.\par 3/16/22: bilateral knee pain persists.\par 4/27/22:  follow up bilateral knees.  some relief with visco but does get flares up with weather changes and prolonged activities.  reports to tightness. \par 6/8/22:  left knee pain improved.  right knee pain persists.\par 6/15/22:  right knee pain continues despite csi on right knee last visit.  \par 7/13/22:  no approval for pt.  still intermittent episodes of pain. \par 8/24/22:  follow up bilateral knees.  still auth pending for pt.  continued tightness and stiffness in knees. \par 9/21/22:  bilateral knee pain stable.  improves with pt but no auth yet. \par 11/2/22:  bilateral knee pain still.  right knee worse.  no auth yet.\par 12/14/22:  follow up bilateral knees. \par 1/25/23:  csi for left knee last visit. \par 2/22/23:  follow up bilateral knees.  increased pain after a fall forward on 2/10/22 after getting dizzy.  patient notes she also fell in july 17 2022.  both times she felt dizzy.\par 4/5/23:  stable knee pain.  mild tightness and stiffness which is intermittent.  reports the csi for right knee did give her relief. \par 5/17/23:  follow up bilateral knees.  still having pain kati with weather changes.  right knee worse than left today. \par 5/31/23:  bilateral knee pain. \par 6/7/23:  bilateral knee pain.  no adverse reactions from first injections. \par 6/14/23:  bilateral knee pain [FreeTextEntry1] : b/l knees [FreeTextEntry3] : 1/5/21 [de-identified] : 06/07/23 [de-identified] : b/l knees [de-identified] : orthovisc

## 2023-06-14 NOTE — DISCUSSION/SUMMARY
[de-identified] : Progress Note completed by Daphne Porter PA-C\par * Dr. Rosas -- The documentation recorded in this note accurately reflects the decisions made by me during this visit.

## 2023-06-21 ENCOUNTER — APPOINTMENT (OUTPATIENT)
Dept: ORTHOPEDIC SURGERY | Facility: CLINIC | Age: 58
End: 2023-06-21
Payer: OTHER MISCELLANEOUS

## 2023-06-21 VITALS — BODY MASS INDEX: 32.93 KG/M2 | HEIGHT: 70 IN | WEIGHT: 230 LBS

## 2023-06-21 PROCEDURE — 99212 OFFICE O/P EST SF 10 MIN: CPT | Mod: 25

## 2023-06-21 PROCEDURE — 20611 DRAIN/INJ JOINT/BURSA W/US: CPT | Mod: 50

## 2023-06-21 NOTE — PROCEDURE
[FreeTextEntry3] : Procedure Name: Orthovisc (Large Joint) with Ultrasound Guidance\par \par Viscosupplementation Injection: X-ray evidence of Osteoarthritis on this or prior visit and Patient has tried OTC's including aspirin, Ibuprofen, Aleve etc or prescription NSAIDS, and/or exercises at home and/ or physical therapy without satisfactory response.  The risks, benefits, and alternatives to Viscosupplementation injection were explained in full to the patient. Risks outlined include but are not limited to infection, sepsis, bleeding, scarring, skin discoloration, temporary increase in pain, syncopal episode, failure to resolve symptoms, allergic reaction, and symptom recurrence. Signs and symptoms of infection reviewed and patient advised to call immediately for redness, fevers, and/or chills. Patient understood the risks. All questions were answered. \par \par Oral informed consent was obtained.   Sterile technique was utilized for the procedure including the preparation of the solutions used for the injection. An injection of Orthovisc 2ml #4 was injected into BILATERAL KNEES.  Patient tolerated the procedure well. Advised to ice the injection site this evening.  Post Procedure Instructions: Patient was advised to call if redness, pain, or fever occur and apply ice for 15 min. out of every hour for the next 12-24 hours as tolerated. patient was advised to rest the joint(s) for 2 days. \par \par Ultrasound Extremity (68061) was used because of the following reasons: inflammation.\par Ultrasound Guidance was used for the following reasons: for accurate placement of needle into knee joint\par Diagnostic ultrasound was performed of the knee and is positive for synovitis.\par Ultrasound guided injection was performed of the knee, visualization of the needle and placement of injection was performed without complication.

## 2023-06-21 NOTE — HISTORY OF PRESENT ILLNESS
[5] : 5 [3] : 3 [Constant] : constant [Rest] : rest [Walking] : walking [] : This patient has had an injection before: yes [4] : 4 [Orthovisc] : Orthovisc [de-identified] : WC DOI 1/5/2021\par 1/27/21: bilateral knee made worse after new injury on 1/5/21.\par 2/24/21: bilateral knee pain persists.\par 3/24/21: follow up bilateral knees. reports to improvement with pt and injections. still having some mild symptoms but overall better.\par 5/5/21: bilateral knee pain still present. right worse than left today.\par 5/26/21: follow up bilateral knees. recent superficial lac on right knee from palm tree on 5/22/21 - received 14 sutures in er\par 6/23/21: bilateral knee pain still. superficial wound right medial knee doing well - sutures removed; cleared by pcp.\par 7/28/21: bilateral knee pain improved but still has some symptoms.\par 9/1/21: bilateral knee pain still present.\par 10/13/21: bilateral knee pain. just restarted pt.\par 11/24/21: bilateral knee pain persists. PT helps.\par 1/5/22: bilateral knee pain mildly improved but still present.\par 2/16/22: bilateral knee pain.\par 3/2/22: bilateral knee pain.\par 3/9/22: bilateral knee pain.\par 3/16/22: bilateral knee pain persists.\par 4/27/22:  follow up bilateral knees.  some relief with visco but does get flares up with weather changes and prolonged activities.  reports to tightness. \par 6/8/22:  left knee pain improved.  right knee pain persists.\par 6/15/22:  right knee pain continues despite csi on right knee last visit.  \par 7/13/22:  no approval for pt.  still intermittent episodes of pain. \par 8/24/22:  follow up bilateral knees.  still auth pending for pt.  continued tightness and stiffness in knees. \par 9/21/22:  bilateral knee pain stable.  improves with pt but no auth yet. \par 11/2/22:  bilateral knee pain still.  right knee worse.  no auth yet.\par 12/14/22:  follow up bilateral knees. \par 1/25/23:  csi for left knee last visit. \par 2/22/23:  follow up bilateral knees.  increased pain after a fall forward on 2/10/22 after getting dizzy.  patient notes she also fell in july 17 2022.  both times she felt dizzy.\par 4/5/23:  stable knee pain.  mild tightness and stiffness which is intermittent.  reports the csi for right knee did give her relief. \par 5/17/23:  follow up bilateral knees.  still having pain kati with weather changes.  right knee worse than left today. \par 5/31/23:  bilateral knee pain. \par 6/7/23:  bilateral knee pain.  no adverse reactions from first injections. \par 6/14/23:  bilateral knee pain\par 6/21/23:  bilateral knee pain persists.  [FreeTextEntry1] : b/l knees [de-identified] : 06/14 [de-identified] : b/l marli [TWNoteComboBox1] : 40%

## 2023-06-21 NOTE — DISCUSSION/SUMMARY
[de-identified] : Progress Note completed by Daphne Porter PA-C\par * Dr. Rosas -- The documentation recorded in this note accurately reflects the decisions made by me during this visit.

## 2023-07-26 ENCOUNTER — APPOINTMENT (OUTPATIENT)
Dept: ORTHOPEDIC SURGERY | Facility: CLINIC | Age: 58
End: 2023-07-26

## 2023-08-02 ENCOUNTER — APPOINTMENT (OUTPATIENT)
Dept: ORTHOPEDIC SURGERY | Facility: CLINIC | Age: 58
End: 2023-08-02
Payer: OTHER MISCELLANEOUS

## 2023-08-02 VITALS — HEIGHT: 70 IN | BODY MASS INDEX: 32.93 KG/M2 | WEIGHT: 230 LBS

## 2023-08-02 PROCEDURE — 73562 X-RAY EXAM OF KNEE 3: CPT | Mod: 50

## 2023-08-02 NOTE — HISTORY OF PRESENT ILLNESS
[Sudden] : sudden [3] : 3 [Dull/Aching] : dull/aching [Shooting] : shooting [Not working due to injury] : Work status: not working due to injury [5] : 5 [Constant] : constant [Rest] : rest [Walking] : walking [] : This patient has had an injection before: yes [4] : 4 [Orthovisc] : Orthovisc [de-identified] : WC DOI 1/5/2021 1/27/21: bilateral knee made worse after new injury on 1/5/21. 2/24/21: bilateral knee pain persists. 3/24/21: follow up bilateral knees. reports to improvement with pt and injections. still having some mild symptoms but overall better. 5/5/21: bilateral knee pain still present. right worse than left today. 5/26/21: follow up bilateral knees. recent superficial lac on right knee from palm tree on 5/22/21 - received 14 sutures in er 6/23/21: bilateral knee pain still. superficial wound right medial knee doing well - sutures removed; cleared by pcp. 7/28/21: bilateral knee pain improved but still has some symptoms. 9/1/21: bilateral knee pain still present. 10/13/21: bilateral knee pain. just restarted pt. 11/24/21: bilateral knee pain persists. PT helps. 1/5/22: bilateral knee pain mildly improved but still present. 2/16/22: bilateral knee pain. 3/2/22: bilateral knee pain. 3/9/22: bilateral knee pain. 3/16/22: bilateral knee pain persists. 4/27/22:  follow up bilateral knees.  some relief with visco but does get flares up with weather changes and prolonged activities.  reports to tightness.  6/8/22:  left knee pain improved.  right knee pain persists. 6/15/22:  right knee pain continues despite csi on right knee last visit.   7/13/22:  no approval for pt.  still intermittent episodes of pain.  8/24/22:  follow up bilateral knees.  still auth pending for pt.  continued tightness and stiffness in knees.  9/21/22:  bilateral knee pain stable.  improves with pt but no auth yet.  11/2/22:  bilateral knee pain still.  right knee worse.  no auth yet. 12/14/22:  follow up bilateral knees.  1/25/23:  csi for left knee last visit.  2/22/23:  follow up bilateral knees.  increased pain after a fall forward on 2/10/22 after getting dizzy.  patient notes she also fell in july 17 2022.  both times she felt dizzy. 4/5/23:  stable knee pain.  mild tightness and stiffness which is intermittent.  reports the csi for right knee did give her relief.  5/17/23:  follow up bilateral knees.  still having pain kati with weather changes.  right knee worse than left today.  5/31/23:  bilateral knee pain.  6/7/23:  bilateral knee pain.  no adverse reactions from first injections.  6/14/23:  bilateral knee pain 6/21/23:  bilateral knee pain persists.  8/2/23:  follow up bilateral knees.  reports the knee pain has increased after a new fall after she had a really bad spasm in the neck on 7/5/23 (patient gets neck spasms and pain). [FreeTextEntry1] : b/l knees [FreeTextEntry5] : Pt states she fell 7/5/23 due to her neck spasm.  [FreeTextEntry6] : pt states it feels like it is going to give out  [de-identified] : 06/14 [de-identified] : b/l marli [TWNoteComboBox1] : 40%

## 2023-08-02 NOTE — DISCUSSION/SUMMARY
[de-identified] : Progress Note completed by Daphne Porter PA-C * Dr. Rosas -- The documentation recorded in this note accurately reflects the decisions made by me during this visit.   hep and PT. mri right knee to assess. follow up after mri.

## 2023-08-02 NOTE — PHYSICAL EXAM
[4___] : quadriceps 4[unfilled]/5 [Left] : left knee [NL (0)] : extension 0 degrees [5___] : hamstring 5[unfilled]/5 [Equivocal] : equivocal Ashley [Right] : right knee [There are no fractures, subluxations or dislocations. No significant abnormalities are seen] : There are no fractures, subluxations or dislocations. No significant abnormalities are seen [Degenerative change] : Degenerative change [Positive] : positive Ashley [] : no erythema [FreeTextEntry8] : still [TWNoteComboBox7] : flexion 120 degrees

## 2023-08-02 NOTE — ASSESSMENT
[FreeTextEntry1] : new injury on 1/5/21 when large monitor fell off wall at work and pinned her again the desk after pushing her back.  history of knee scopes.    knee pain has increased after a new fall after she had a really bad spasm in the neck on 7/5/23.  right knee mri 2021 suggest oa exacerbation right knee with sprain.   csi on 5/17/23.    increased after new fall.  concern for possible new meniscus tear, quad strain.  some weakness in extension but able to slr..  left knee mri 2021 suggests new tear left mm and oa exacerbation.   csi on 12/14/22.  increased after new fall.   has concussion symptoms as well which she is getting treatment for.  seeing neuro.  patient also notes she has gotten dizzy at least 2 times and had fall in july 2022 and feb 2022 which has caused her to fall.    patient had another fall on 7/5/23.

## 2023-08-09 ENCOUNTER — RESULT REVIEW (OUTPATIENT)
Age: 58
End: 2023-08-09

## 2023-08-10 ENCOUNTER — APPOINTMENT (OUTPATIENT)
Dept: ORTHOPEDIC SURGERY | Facility: CLINIC | Age: 58
End: 2023-08-10
Payer: OTHER MISCELLANEOUS

## 2023-08-10 VITALS — WEIGHT: 230 LBS | HEIGHT: 70 IN | BODY MASS INDEX: 32.93 KG/M2

## 2023-08-10 DIAGNOSIS — S93.401A SPRAIN OF UNSPECIFIED LIGAMENT OF RIGHT ANKLE, INITIAL ENCOUNTER: ICD-10-CM

## 2023-08-10 DIAGNOSIS — Z00.00 ENCOUNTER FOR GENERAL ADULT MEDICAL EXAMINATION W/OUT ABNORMAL FINDINGS: ICD-10-CM

## 2023-08-10 PROCEDURE — 73610 X-RAY EXAM OF ANKLE: CPT | Mod: RT

## 2023-08-10 PROCEDURE — 99214 OFFICE O/P EST MOD 30 MIN: CPT

## 2023-08-10 NOTE — WORK
[Sprain/Strain] : sprain/strain [Was the competent medical cause of the injury] : was the competent medical cause of the injury [Are consistent with the injury] : are consistent with the injury [Consistent with my objective findings] : consistent with my objective findings [Does not reveal pre-existing condition(s) that may affect treatment/prognosis] : does not reveal pre-existing condition(s) that may affect treatment/prognosis [N/A] : : Not Applicable [Patient] : patient [No Rx restrictions] : No Rx restrictions. [I provided the services listed above] :  I provided the services listed above. [FreeTextEntry1] : good [FreeTextEntry2] : Pt. is OOW.

## 2023-08-10 NOTE — ASSESSMENT
[FreeTextEntry1] : WBAT in supportive footwear. NSAIDS, ice, activity modification.   Recommend a course of PT.  Pt. can still do RT knee and vestibular therapy despite her right ankle sprain.

## 2023-08-10 NOTE — PHYSICAL EXAM
[NL (40)] : plantar flexion 40 degrees [5___] : eversion 5[unfilled]/5 [2+] : posterior tibialis pulse: 2+ [Normal] : saphenous nerve sensation normal [] : no pain when stressing lateral tarsal metatarsal joint [Right] : right ankle [Weight -] : weightbearing [FreeTextEntry9] : Plantar heel spur.  [de-identified] : inversion 10 degrees [de-identified] : eversion 10 degrees [TWNoteComboBox7] : dorsiflexion 10 degrees

## 2023-08-10 NOTE — HISTORY OF PRESENT ILLNESS
[Work related] : work related [Sudden] : sudden [10] : 10 [7] : 7 [Dull/Aching] : dull/aching [Radiating] : radiating [Stabbing] : stabbing [Constant] : constant [Household chores] : household chores [Leisure] : leisure [Social interactions] : social interactions [Ice] : ice [Not working due to injury] : Work status: not working due to injury [de-identified] : Pt. is a 58 year old female who presents for evaluation of her RT foot/ankle. She developed severe neck pain (which is under WC from an injury) on 7/5/23 which caused her to fall and twist her ankle. WB in sandals. No previous injury/problem with RT foot/ankle. No formal treatment to date. She is OOW due to chronic cervical spine condition.  [] : no [FreeTextEntry1] : rt foot [FreeTextEntry3] : 1-5-21 [FreeTextEntry5] : work incident 2021 but while getting ready for a dr appt had a stabbing in neck went to alleviate that pain and meanwhile twisted ankle 2021 but a smart board fell on pts head  [FreeTextEntry7] : ankle pain as well [FreeTextEntry9] : elevation and being in pool [de-identified] : weight bearing on ankle

## 2023-08-16 ENCOUNTER — APPOINTMENT (OUTPATIENT)
Dept: ORTHOPEDIC SURGERY | Facility: CLINIC | Age: 58
End: 2023-08-16
Payer: OTHER MISCELLANEOUS

## 2023-08-16 VITALS — WEIGHT: 230 LBS | HEIGHT: 70 IN | BODY MASS INDEX: 32.93 KG/M2

## 2023-08-16 DIAGNOSIS — M23.92 UNSPECIFIED INTERNAL DERANGEMENT OF LEFT KNEE: ICD-10-CM

## 2023-08-16 DIAGNOSIS — M23.91 UNSPECIFIED INTERNAL DERANGEMENT OF RIGHT KNEE: ICD-10-CM

## 2023-08-16 PROCEDURE — 99213 OFFICE O/P EST LOW 20 MIN: CPT

## 2023-08-16 RX ORDER — ZINC SULFATE 66 MG
TABLET ORAL
Refills: 0 | Status: ACTIVE | COMMUNITY

## 2023-08-16 NOTE — ASSESSMENT
[FreeTextEntry1] : new injury on 1/5/21 when large monitor fell off wall at work and pinned her again the desk after pushing her back.  history of knee scopes.    knee pain has increased after a new fall after she had a really bad spasm in the neck on 7/5/23.  right knee mri 2021 suggest oa exacerbation right knee with sprain.   csi on 5/17/23.    mri 2023 shows oa, no new large tear.  still some pain.    left knee mri 2021 suggests new tear left mm and oa exacerbation.   csi on 12/14/22.  increased after new fall.   has concussion symptoms as well which she is getting treatment for.  seeing neuro.  patient also notes she has gotten dizzy at least 2 times and had fall in july 2022 and feb 2022 which has caused her to fall.    patient had another fall on 7/5/23.

## 2023-08-16 NOTE — HISTORY OF PRESENT ILLNESS
[Work related] : work related [5] : 5 [2] : 2 [Dull/Aching] : dull/aching [Stabbing] : stabbing [Throbbing] : throbbing [Not working due to injury] : Work status: not working due to injury [] : yes [de-identified] : WC DOI 1/5/2021 1/27/21: bilateral knee made worse after new injury on 1/5/21. 2/24/21: bilateral knee pain persists. 3/24/21: follow up bilateral knees. reports to improvement with pt and injections. still having some mild symptoms but overall better. 5/5/21: bilateral knee pain still present. right worse than left today. 5/26/21: follow up bilateral knees. recent superficial lac on right knee from palm tree on 5/22/21 - received 14 sutures in er 6/23/21: bilateral knee pain still. superficial wound right medial knee doing well - sutures removed; cleared by pcp. 7/28/21: bilateral knee pain improved but still has some symptoms. 9/1/21: bilateral knee pain still present. 10/13/21: bilateral knee pain. just restarted pt. 11/24/21: bilateral knee pain persists. PT helps. 1/5/22: bilateral knee pain mildly improved but still present. 2/16/22: bilateral knee pain. 3/2/22: bilateral knee pain. 3/9/22: bilateral knee pain. 3/16/22: bilateral knee pain persists. 4/27/22:  follow up bilateral knees.  some relief with visco but does get flares up with weather changes and prolonged activities.  reports to tightness.  6/8/22:  left knee pain improved.  right knee pain persists. 6/15/22:  right knee pain continues despite csi on right knee last visit.   7/13/22:  no approval for pt.  still intermittent episodes of pain.  8/24/22:  follow up bilateral knees.  still auth pending for pt.  continued tightness and stiffness in knees.  9/21/22:  bilateral knee pain stable.  improves with pt but no auth yet.  11/2/22:  bilateral knee pain still.  right knee worse.  no auth yet. 12/14/22:  follow up bilateral knees.  1/25/23:  csi for left knee last visit.  2/22/23:  follow up bilateral knees.  increased pain after a fall forward on 2/10/22 after getting dizzy.  patient notes she also fell in july 17 2022.  both times she felt dizzy. 4/5/23:  stable knee pain.  mild tightness and stiffness which is intermittent.  reports the csi for right knee did give her relief.  5/17/23:  follow up bilateral knees.  still having pain kati with weather changes.  right knee worse than left today.  5/31/23:  bilateral knee pain.  6/7/23:  bilateral knee pain.  no adverse reactions from first injections.  6/14/23:  bilateral knee pain 6/21/23:  bilateral knee pain persists.  8/2/23:  follow up bilateral knees.  reports the knee pain has increased after a new fall after she had a really bad spasm in the neck on 7/5/23 (patient gets neck spasms and pain). 8/16/23: follow up mri.  still having pain. [FreeTextEntry1] : R knee  [FreeTextEntry3] : 1/5/21 [de-identified] : MRI

## 2023-08-16 NOTE — PHYSICAL EXAM
[4___] : quadriceps 4[unfilled]/5 [Positive] : positive Ashley [Left] : left knee [NL (0)] : extension 0 degrees [5___] : hamstring 5[unfilled]/5 [Equivocal] : equivocal Ashley [Right] : right knee [There are no fractures, subluxations or dislocations. No significant abnormalities are seen] : There are no fractures, subluxations or dislocations. No significant abnormalities are seen [Degenerative change] : Degenerative change [] : no erythema [FreeTextEntry8] : still [TWNoteComboBox7] : flexion 100 degrees

## 2023-08-16 NOTE — DISCUSSION/SUMMARY
[de-identified] : hep and PT. follow up 4 - 6 weeks.  Progress Note completed by Daphne Porter PA-C * Dr. Rosas -- The documentation recorded in this note accurately reflects the decisions made by me during this visit.

## 2023-09-06 ENCOUNTER — APPOINTMENT (OUTPATIENT)
Dept: UROLOGY | Facility: CLINIC | Age: 58
End: 2023-09-06
Payer: MEDICARE

## 2023-09-06 PROCEDURE — 99214 OFFICE O/P EST MOD 30 MIN: CPT

## 2023-09-06 RX ORDER — BUPROPION HYDROCHLORIDE 200 MG/1
200 TABLET, FILM COATED, EXTENDED RELEASE ORAL DAILY
Qty: 30 | Refills: 0 | Status: ACTIVE | COMMUNITY
Start: 2023-09-06

## 2023-09-06 RX ORDER — SUMATRIPTAN 100 MG/1
100 TABLET, FILM COATED ORAL
Qty: 30 | Refills: 0 | Status: ACTIVE | COMMUNITY
Start: 2023-09-06

## 2023-09-06 RX ORDER — SOLIFENACIN SUCCINATE 10 MG/1
10 TABLET ORAL
Qty: 90 | Refills: 3 | Status: ACTIVE | COMMUNITY
Start: 2022-08-08 | End: 1900-01-01

## 2023-09-06 NOTE — HISTORY OF PRESENT ILLNESS
[FreeTextEntry1] : The patient is on Solifenacin.  No accidents.  She has frequency, at least every hour.  Nocturia is uncommon.  No dysuria. No pads needed.

## 2023-09-06 NOTE — LETTER BODY
[Dear  ___] : Dear  [unfilled], [Courtesy Letter:] : I had the pleasure of seeing your patient, [unfilled], in my office today. [Please see my note below.] : Please see my note below. [Sincerely,] : Sincerely, [FreeTextEntry3] : Ed  Ridge Cisneros MD [facility]  [facility] [employer] [facility] [employer] [facility]

## 2023-09-28 ENCOUNTER — APPOINTMENT (OUTPATIENT)
Dept: ORTHOPEDIC SURGERY | Facility: CLINIC | Age: 58
End: 2023-09-28
Payer: OTHER MISCELLANEOUS

## 2023-09-28 VITALS — HEIGHT: 70 IN | BODY MASS INDEX: 32.93 KG/M2 | WEIGHT: 230 LBS

## 2023-09-28 PROCEDURE — 99213 OFFICE O/P EST LOW 20 MIN: CPT

## 2023-10-11 ENCOUNTER — APPOINTMENT (OUTPATIENT)
Dept: ORTHOPEDIC SURGERY | Facility: CLINIC | Age: 58
End: 2023-10-11
Payer: OTHER MISCELLANEOUS

## 2023-10-11 VITALS — HEIGHT: 70 IN | WEIGHT: 230 LBS | BODY MASS INDEX: 32.93 KG/M2

## 2023-10-11 PROCEDURE — 99213 OFFICE O/P EST LOW 20 MIN: CPT

## 2023-10-25 ENCOUNTER — OFFICE (OUTPATIENT)
Dept: URBAN - METROPOLITAN AREA CLINIC 105 | Facility: CLINIC | Age: 58
Setting detail: OPHTHALMOLOGY
End: 2023-10-25
Payer: MEDICARE

## 2023-10-25 DIAGNOSIS — S06.0X0S: ICD-10-CM

## 2023-10-25 DIAGNOSIS — H40.003: ICD-10-CM

## 2023-10-25 DIAGNOSIS — H40.033: ICD-10-CM

## 2023-10-25 DIAGNOSIS — H25.13: ICD-10-CM

## 2023-10-25 PROCEDURE — 92020 GONIOSCOPY: CPT | Performed by: STUDENT IN AN ORGANIZED HEALTH CARE EDUCATION/TRAINING PROGRAM

## 2023-10-25 PROCEDURE — 92014 COMPRE OPH EXAM EST PT 1/>: CPT | Performed by: STUDENT IN AN ORGANIZED HEALTH CARE EDUCATION/TRAINING PROGRAM

## 2023-10-25 PROCEDURE — 92250 FUNDUS PHOTOGRAPHY W/I&R: CPT | Performed by: STUDENT IN AN ORGANIZED HEALTH CARE EDUCATION/TRAINING PROGRAM

## 2023-10-25 ASSESSMENT — REFRACTION_CURRENTRX
OD_AXIS: 072
OD_OVR_VA: 20/
OS_SPHERE: +2.50
OD_AXIS: 049
OD_SPHERE: +3.00
OD_SPHERE: +1.75
OS_AXIS: 108
OD_AXIS: 075
OS_SPHERE: +1.75
OS_CYLINDER: SPH
OS_SPHERE: +4.50
OD_SPHERE: +5.25
OD_CYLINDER: -0.50
OS_AXIS: 100
OD_OVR_VA: 20/
OD_AXIS: 079
OS_OVR_VA: 20/
OS_CYLINDER: -0.50
OD_OVR_VA: 20/
OD_CYLINDER: SPH
OD_SPHERE: +4.75
OD_SPHERE: +2.50
OS_OVR_VA: 20/
OS_AXIS: 105
OS_VPRISM_DIRECTION: SV
OS_CYLINDER: -0.25
OS_SPHERE: +2.50
OD_CYLINDER: -0.75
OD_VPRISM_DIRECTION: SV
OS_SPHERE: +4.25
OD_VPRISM_DIRECTION: SV
OS_AXIS: 092
OS_VPRISM_DIRECTION: SV
OS_VPRISM_DIRECTION: SV
OD_VPRISM_DIRECTION: SV
OD_CYLINDER: +0.75
OS_CYLINDER: -0.50
OD_CYLINDER: -0.50
OS_OVR_VA: 20/
OS_CYLINDER: -0.25

## 2023-10-25 ASSESSMENT — PACHYMETRY
OD_CT_CORRECTION: -5
OS_CT_CORRECTION: -5
OS_CT_UM: 610
OD_CT_UM: 610

## 2023-10-25 ASSESSMENT — SPHEQUIV_DERIVED
OD_SPHEQUIV: 2.625
OD_SPHEQUIV: 2.125
OS_SPHEQUIV: 2.5
OD_SPHEQUIV: 2.375
OS_SPHEQUIV: 1.75
OS_SPHEQUIV: 1.75

## 2023-10-25 ASSESSMENT — AXIALLENGTH_DERIVED
OS_AL: 23.4165
OD_AL: 23.3127
OD_AL: 23.218
OS_AL: 23.1321
OD_AL: 23.4082
OS_AL: 23.4165

## 2023-10-25 ASSESSMENT — REFRACTION_MANIFEST
OD_AXIS: 075
OD_SPHERE: +2.75
OD_ADD: +1.75
OS_VA1: 20/20
OS_AXIS: 105
OD_VPRISM: BD
OD_SPHERE: +2.50
OD_CYLINDER: -0.75
OS_VPRISM: BU
OS_ADD: +1.75
OS_CYLINDER: -0.50
OD_VA1: 20/20
OS_SPHERE: +2.00
OS_AXIS: 105
OD_AXIS: 075
OS_CYLINDER: -0.50
OD_CYLINDER: -0.75
OS_SPHERE: +2.00

## 2023-10-25 ASSESSMENT — VISUAL ACUITY
OD_BCVA: 20/20-1
OS_BCVA: 20/20-1

## 2023-10-25 ASSESSMENT — REFRACTION_AUTOREFRACTION
OD_SPHERE: +3.00
OS_AXIS: 091
OS_SPHERE: +2.75
OD_AXIS: 106
OD_CYLINDER: -0.75
OS_CYLINDER: -0.50

## 2023-10-25 ASSESSMENT — KERATOMETRY
OD_K1POWER_DIOPTERS: 41.75
OD_K2POWER_DIOPTERS: 41.75
OS_K1POWER_DIOPTERS: 42.00
OS_K2POWER_DIOPTERS: 42.25
OS_AXISANGLE_DEGREES: 106
OD_AXISANGLE_DEGREES: 090

## 2023-10-25 ASSESSMENT — CONFRONTATIONAL VISUAL FIELD TEST (CVF)
OD_FINDINGS: FULL
OS_FINDINGS: FULL

## 2023-10-25 ASSESSMENT — TONOMETRY
OD_IOP_MMHG: 21
OS_IOP_MMHG: 20

## 2023-11-09 ENCOUNTER — APPOINTMENT (OUTPATIENT)
Dept: ORTHOPEDIC SURGERY | Facility: CLINIC | Age: 58
End: 2023-11-09
Payer: OTHER MISCELLANEOUS

## 2023-11-09 VITALS — WEIGHT: 230 LBS | HEIGHT: 70 IN | BODY MASS INDEX: 32.93 KG/M2

## 2023-11-09 DIAGNOSIS — M76.821 POSTERIOR TIBIAL TENDINITIS, RIGHT LEG: ICD-10-CM

## 2023-11-09 PROCEDURE — 99213 OFFICE O/P EST LOW 20 MIN: CPT

## 2023-11-15 ENCOUNTER — APPOINTMENT (OUTPATIENT)
Dept: ORTHOPEDIC SURGERY | Facility: CLINIC | Age: 58
End: 2023-11-15
Payer: OTHER MISCELLANEOUS

## 2023-11-15 VITALS — WEIGHT: 230 LBS | HEIGHT: 70 IN | BODY MASS INDEX: 32.93 KG/M2

## 2023-11-15 PROCEDURE — 20611 DRAIN/INJ JOINT/BURSA W/US: CPT | Mod: RT

## 2023-11-15 PROCEDURE — 99214 OFFICE O/P EST MOD 30 MIN: CPT | Mod: 25

## 2023-11-15 PROCEDURE — J3490M: CUSTOM

## 2023-12-13 ENCOUNTER — APPOINTMENT (OUTPATIENT)
Dept: ORTHOPEDIC SURGERY | Facility: CLINIC | Age: 58
End: 2023-12-13
Payer: OTHER MISCELLANEOUS

## 2023-12-13 VITALS — HEIGHT: 70 IN | WEIGHT: 230 LBS | BODY MASS INDEX: 32.93 KG/M2

## 2023-12-13 PROCEDURE — 99213 OFFICE O/P EST LOW 20 MIN: CPT

## 2023-12-13 NOTE — HISTORY OF PRESENT ILLNESS
[Work related] : work related [2] : 2 [Burning] : burning [Dull/Aching] : dull/aching [Throbbing] : throbbing [] : yes [5] : 5 [Stabbing] : stabbing [Not working due to injury] : Work status: not working due to injury [de-identified] : WC DOI 1/5/2021 1/27/21: bilateral knee made worse after new injury on 1/5/21. 2/24/21: bilateral knee pain persists. 3/24/21: follow up bilateral knees. reports to improvement with pt and injections. still having some mild symptoms but overall better. 5/5/21: bilateral knee pain still present. right worse than left today. 5/26/21: follow up bilateral knees. recent superficial lac on right knee from palm tree on 5/22/21 - received 14 sutures in er 6/23/21: bilateral knee pain still. superficial wound right medial knee doing well - sutures removed; cleared by pcp. 7/28/21: bilateral knee pain improved but still has some symptoms. 9/1/21: bilateral knee pain still present. 10/13/21: bilateral knee pain. just restarted pt. 11/24/21: bilateral knee pain persists. PT helps. 1/5/22: bilateral knee pain mildly improved but still present. 2/16/22: bilateral knee pain. 3/2/22: bilateral knee pain. 3/9/22: bilateral knee pain. 3/16/22: bilateral knee pain persists. 4/27/22:  follow up bilateral knees.  some relief with visco but does get flares up with weather changes and prolonged activities.  reports to tightness.  6/8/22:  left knee pain improved.  right knee pain persists. 6/15/22:  right knee pain continues despite csi on right knee last visit.   7/13/22:  no approval for pt.  still intermittent episodes of pain.  8/24/22:  follow up bilateral knees.  still auth pending for pt.  continued tightness and stiffness in knees.  9/21/22:  bilateral knee pain stable.  improves with pt but no auth yet.  11/2/22:  bilateral knee pain still.  right knee worse.  no auth yet. 12/14/22:  follow up bilateral knees.  1/25/23:  csi for left knee last visit.  2/22/23:  follow up bilateral knees.  increased pain after a fall forward on 2/10/22 after getting dizzy.  patient notes she also fell in july 17 2022.  both times she felt dizzy. 4/5/23:  stable knee pain.  mild tightness and stiffness which is intermittent.  reports the csi for right knee did give her relief.  5/17/23:  follow up bilateral knees.  still having pain kati with weather changes.  right knee worse than left today.  5/31/23:  bilateral knee pain.  6/7/23:  bilateral knee pain.  no adverse reactions from first injections.  6/14/23:  bilateral knee pain 6/21/23:  bilateral knee pain persists.  8/2/23:  follow up bilateral knees.  reports the knee pain has increased after a new fall after she had a really bad spasm in the neck on 7/5/23 (patient gets neck spasms and pain). 8/16/23: follow up mri.  still having pain. 10/11/23: bilateral knee pain persists. 11/1523:  bilateral knee pain persists.  right worse than left. 12/13/23:  right knee improved with csi last visit.  left knee mild discomfort.  [FreeTextEntry1] : R knee  [FreeTextEntry3] : 1/5/21 [FreeTextEntry5] : pt states R knee is worse than the L knee  [de-identified] : MRI

## 2023-12-13 NOTE — WORK
[Total (100%)] : total (100%) [Does not reveal pre-existing condition(s) that may affect treatment/prognosis] : does not reveal pre-existing condition(s) that may affect treatment/prognosis [Cannot return to work because ________] : cannot return to work because [unfilled] [Bending/Twisting] : bending/twisting [Climbing stairs/Ladders] : climbing stairs/ladders [Kneeling] : kneeling [Walking] : walking [Standing] : standing [Patient] : patient [No Rx restrictions] : No Rx restrictions. [I provided the services listed above] :  I provided the services listed above. 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

## 2023-12-13 NOTE — ASSESSMENT
[FreeTextEntry1] : new injury on 1/5/21 when large monitor fell off wall at work and pinned her again the desk after pushing her back. history of knee scopes. knee pain has increased after a new fall after she had a really bad spasm in the neck on 7/5/23.  right knee mri 2021 suggest oa exacerbation right knee with sprain. mri 2023 shows oa, no new large tear. right knee pain a little worse.  csi on 11/15/23 with improvement.    left knee mri 2021 suggests new tear left mm and oa exacerbation. csi on 12/14/22.   has concussion symptoms as well which she is getting treatment for. seeing neuro. patient also notes she has gotten dizzy at least 2 times and had fall in july 2022 and feb 2022 which has caused her to fall. patient had another fall on 7/5/23.

## 2023-12-13 NOTE — PHYSICAL EXAM
[Positive] : positive Ashley [Left] : left knee [NL (0)] : extension 0 degrees [4___] : quadriceps 4[unfilled]/5 [5___] : hamstring 5[unfilled]/5 [Equivocal] : equivocal Ashley [Right] : right knee [There are no fractures, subluxations or dislocations. No significant abnormalities are seen] : There are no fractures, subluxations or dislocations. No significant abnormalities are seen [Degenerative change] : Degenerative change [] : no erythema [FreeTextEntry8] : still [TWNoteComboBox7] : flexion 100 degrees

## 2023-12-13 NOTE — DISCUSSION/SUMMARY
[de-identified] : hep.  will request orthovisc bilateral knees - if in pain in 6 weeks will do repeat series.   Progress Note completed by Daphne Porter PA-C * Dr. Rosas -- The documentation recorded in this note accurately reflects the decisions made by me during this visit.

## 2023-12-14 ENCOUNTER — OFFICE (OUTPATIENT)
Dept: URBAN - METROPOLITAN AREA CLINIC 105 | Facility: CLINIC | Age: 58
Setting detail: OPHTHALMOLOGY
End: 2023-12-14
Payer: MEDICARE

## 2023-12-14 ENCOUNTER — APPOINTMENT (OUTPATIENT)
Dept: ORTHOPEDIC SURGERY | Facility: CLINIC | Age: 58
End: 2023-12-14
Payer: OTHER MISCELLANEOUS

## 2023-12-14 VITALS — WEIGHT: 230 LBS | HEIGHT: 70 IN | BODY MASS INDEX: 32.93 KG/M2

## 2023-12-14 DIAGNOSIS — H40.003: ICD-10-CM

## 2023-12-14 PROCEDURE — 99213 OFFICE O/P EST LOW 20 MIN: CPT

## 2023-12-14 PROCEDURE — 99213 OFFICE O/P EST LOW 20 MIN: CPT | Performed by: STUDENT IN AN ORGANIZED HEALTH CARE EDUCATION/TRAINING PROGRAM

## 2023-12-14 PROCEDURE — 92083 EXTENDED VISUAL FIELD XM: CPT | Performed by: STUDENT IN AN ORGANIZED HEALTH CARE EDUCATION/TRAINING PROGRAM

## 2023-12-14 PROCEDURE — 92133 CPTRZD OPH DX IMG PST SGM ON: CPT | Performed by: STUDENT IN AN ORGANIZED HEALTH CARE EDUCATION/TRAINING PROGRAM

## 2023-12-14 ASSESSMENT — REFRACTION_MANIFEST
OD_AXIS: 075
OD_SPHERE: +2.50
OS_SPHERE: +2.00
OS_SPHERE: +2.00
OD_ADD: +1.75
OD_CYLINDER: -0.75
OS_CYLINDER: -0.50
OS_VPRISM: BU
OS_CYLINDER: -0.50
OD_AXIS: 075
OD_SPHERE: +2.75
OD_VA1: 20/20
OS_AXIS: 105
OD_CYLINDER: -0.75
OS_AXIS: 105
OS_VA1: 20/20
OS_ADD: +1.75
OD_VPRISM: BD

## 2023-12-14 ASSESSMENT — REFRACTION_CURRENTRX
OS_SPHERE: +1.75
OD_AXIS: 072
OS_OVR_VA: 20/
OS_SPHERE: +2.50
OD_OVR_VA: 20/
OS_CYLINDER: SPH
OD_SPHERE: +4.75
OS_CYLINDER: -0.50
OS_CYLINDER: -0.25
OD_CYLINDER: -0.50
OS_OVR_VA: 20/
OS_CYLINDER: -0.25
OD_AXIS: 049
OS_SPHERE: +4.25
OD_SPHERE: +5.25
OD_AXIS: 075
OD_OVR_VA: 20/
OD_VPRISM_DIRECTION: SV
OD_CYLINDER: SPH
OD_CYLINDER: -0.50
OS_VPRISM_DIRECTION: SV
OD_SPHERE: +1.75
OS_CYLINDER: -0.50
OS_SPHERE: +2.50
OD_SPHERE: +2.50
OD_CYLINDER: -0.75
OD_CYLINDER: +0.75
OD_OVR_VA: 20/
OS_AXIS: 105
OS_AXIS: 092
OS_VPRISM_DIRECTION: SV
OS_OVR_VA: 20/
OS_VPRISM_DIRECTION: SV
OD_SPHERE: +3.00
OS_SPHERE: +4.50
OD_VPRISM_DIRECTION: SV
OS_AXIS: 100
OS_AXIS: 108
OD_VPRISM_DIRECTION: SV
OD_AXIS: 079

## 2023-12-14 ASSESSMENT — SPHEQUIV_DERIVED
OD_SPHEQUIV: 2.375
OS_SPHEQUIV: 1.75
OS_SPHEQUIV: 1.75
OD_SPHEQUIV: 2.125
OD_SPHEQUIV: 2.75
OS_SPHEQUIV: 2.25

## 2023-12-14 ASSESSMENT — REFRACTION_AUTOREFRACTION
OD_SPHERE: +3.00
OS_CYLINDER: -0.50
OS_SPHERE: +2.50
OD_AXIS: 092
OD_CYLINDER: -0.50
OS_AXIS: 084

## 2023-12-14 ASSESSMENT — CONFRONTATIONAL VISUAL FIELD TEST (CVF)
OS_FINDINGS: FULL
OD_FINDINGS: FULL

## 2023-12-14 NOTE — REVIEW OF SYSTEMS
[Joint Pain] : joint pain [Joint Stiffness] : joint stiffness [Joint Swelling] : joint swelling [Negative] : Heme/Lymph [FreeTextEntry9] : cracking

## 2023-12-14 NOTE — DISCUSSION/SUMMARY
[de-identified] : WBAT in supportive footwear. Pt. still recommended a course of PT.  She has a chronic cervical spine condition under WC, developed severe neck pain that caused her to stumble and injure her ankle. It is reasonable that her ankle symptoms are consequential to her initial WC injury.  Ice to affected area. Activity modification.

## 2023-12-14 NOTE — HISTORY OF PRESENT ILLNESS
[Work related] : work related [Sudden] : sudden [2] : 2 [Burning] : burning [Dull/Aching] : dull/aching [Radiating] : radiating [Constant] : constant [Household chores] : household chores [Leisure] : leisure [Social interactions] : social interactions [Ice] : ice [Standing] : standing [Stairs] : stairs [Not working due to injury] : Work status: not working due to injury [de-identified] : Pt. presents for f/u of her RT foot/ankle. She developed severe neck pain (which is under WC from an injury) on 7/5/23 which caused her to fall and twist her ankle. WB without assistive device. No previous injury/problem with RT foot/ankle. She has not been authorized to go to PT for her ankle. Ice to affected area. Had a hearing and ankle not officially connected to WC case, has to have RONNIE as next step. She is OOW due to chronic cervical spine condition.  [] : no [FreeTextEntry1] : rt foot [FreeTextEntry3] : 1-5-21 [FreeTextEntry5] : work incident 2021 but while getting ready for a dr appt had a stabbing in neck went to alleviate that pain and meanwhile twisted ankle 2021 but a smart board fell on pts head  [FreeTextEntry9] : OTC brace [de-identified] : has not started PT [de-identified] : lifting

## 2023-12-14 NOTE — PHYSICAL EXAM
[Right] : right foot and ankle [NL (40)] : plantar flexion 40 degrees [2+] : posterior tibialis pulse: 2+ [Normal] : saphenous nerve sensation normal [Mild] : mild diffused ankle swelling [5___] : eversion 5[unfilled]/5 [] : no pain when stressing lateral tarsal metatarsal joint [de-identified] : inversion 20 degrees [de-identified] : eversion 15 degrees [TWNoteComboBox7] : dorsiflexion 10 degrees

## 2023-12-14 NOTE — WORK
[Partial] : partial [Does not reveal pre-existing condition(s) that may affect treatment/prognosis] : does not reveal pre-existing condition(s) that may affect treatment/prognosis [N/A] : : Not Applicable [Patient] : patient [No Rx restrictions] : No Rx restrictions. [I provided the services listed above] :  I provided the services listed above. [FreeTextEntry1] : good [FreeTextEntry2] : Pt. is OOW.

## 2024-01-25 ENCOUNTER — APPOINTMENT (OUTPATIENT)
Dept: ORTHOPEDIC SURGERY | Facility: CLINIC | Age: 59
End: 2024-01-25
Payer: OTHER MISCELLANEOUS

## 2024-01-25 VITALS — HEIGHT: 70 IN | WEIGHT: 230 LBS | BODY MASS INDEX: 32.93 KG/M2

## 2024-01-25 PROCEDURE — 99213 OFFICE O/P EST LOW 20 MIN: CPT

## 2024-03-06 ENCOUNTER — APPOINTMENT (OUTPATIENT)
Dept: UROLOGY | Facility: CLINIC | Age: 59
End: 2024-03-06
Payer: MEDICARE

## 2024-03-06 VITALS — SYSTOLIC BLOOD PRESSURE: 111 MMHG | DIASTOLIC BLOOD PRESSURE: 77 MMHG | HEART RATE: 81 BPM

## 2024-03-06 PROCEDURE — 99213 OFFICE O/P EST LOW 20 MIN: CPT

## 2024-03-06 RX ORDER — TOPIRAMATE 50 MG/1
50 TABLET, FILM COATED ORAL TWICE DAILY
Qty: 60 | Refills: 0 | Status: DISCONTINUED | COMMUNITY
Start: 2023-03-08 | End: 2024-03-06

## 2024-03-06 RX ORDER — UMECLIDINIUM BROMIDE AND VILANTEROL TRIFENATATE 62.5; 25 UG/1; UG/1
62.5-25 POWDER RESPIRATORY (INHALATION)
Refills: 0 | Status: ACTIVE | COMMUNITY

## 2024-03-06 RX ORDER — FUROSEMIDE 40 MG/1
40 TABLET ORAL
Refills: 0 | Status: ACTIVE | COMMUNITY

## 2024-03-06 RX ORDER — ALBUTEROL 90 MCG
AEROSOL (GRAM) INHALATION
Refills: 0 | Status: ACTIVE | COMMUNITY

## 2024-03-06 RX ORDER — TOPIRAMATE 100 MG/1
100 CAPSULE, EXTENDED RELEASE ORAL
Refills: 0 | Status: ACTIVE | COMMUNITY

## 2024-03-06 NOTE — PHYSICAL EXAM
[General Appearance - Well Developed] : well developed [General Appearance - Well Nourished] : well nourished [Heart Rate And Rhythm] : heart rate and rhythm were normal [] : no respiratory distress [Bowel Sounds] : normal bowel sounds [Normal Station and Gait] : the gait and station were normal for the patient's age [Skin Color & Pigmentation] : normal skin color and pigmentation [No Focal Deficits] : no focal deficits

## 2024-03-06 NOTE — ASSESSMENT
[FreeTextEntry1] : Impression:  urinary frequency  Plan;  continue solifenacin.  follow up in 6 months.

## 2024-03-06 NOTE — HISTORY OF PRESENT ILLNESS
[FreeTextEntry1] : patient on solifenacin. No appreciable sided effects such as constipation or dry mouth. Has noticed increased frequency only related to the implementation of a diuretic for fluid in her lungs. She has seen a cardiologist for this.  Also seeing pulmonary as her cardiology tests turned out normal.

## 2024-03-06 NOTE — LETTER BODY
[Dear  ___] : Dear  [unfilled], [Courtesy Letter:] : I had the pleasure of seeing your patient, [unfilled], in my office today. [Please see my note below.] : Please see my note below. [Sincerely,] : Sincerely, [FreeTextEntry3] : Ed  Ridge Cisneros MD [facility]  of [facility] [place] and [employer] at [facility]

## 2024-03-14 ENCOUNTER — APPOINTMENT (OUTPATIENT)
Dept: ORTHOPEDIC SURGERY | Facility: CLINIC | Age: 59
End: 2024-03-14
Payer: OTHER MISCELLANEOUS

## 2024-03-14 VITALS — WEIGHT: 230 LBS | BODY MASS INDEX: 32.93 KG/M2 | HEIGHT: 70 IN

## 2024-03-14 PROCEDURE — 99214 OFFICE O/P EST MOD 30 MIN: CPT

## 2024-03-14 NOTE — WORK
[Partial] : partial [N/A] : : Not Applicable [Does not reveal pre-existing condition(s) that may affect treatment/prognosis] : does not reveal pre-existing condition(s) that may affect treatment/prognosis [No Rx restrictions] : No Rx restrictions. [Patient] : patient [I provided the services listed above] :  I provided the services listed above. [FreeTextEntry1] : good [FreeTextEntry2] : Pt. is OOW.

## 2024-03-14 NOTE — REVIEW OF SYSTEMS
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

## 2024-03-14 NOTE — PHYSICAL EXAM
[Right] : right foot and ankle [NL (40)] : plantar flexion 40 degrees [5___] : eversion 5[unfilled]/5 [4___] : inversion 4[unfilled]/5 [2+] : posterior tibialis pulse: 2+ [Normal] : saphenous nerve sensation normal [Mild] : mild diffused ankle swelling [] : no pain when stressing lateral tarsal metatarsal joint [de-identified] : inversion 20 degrees [de-identified] : eversion 15 degrees [TWNoteComboBox7] : dorsiflexion 10 degrees

## 2024-03-14 NOTE — HISTORY OF PRESENT ILLNESS
[Work related] : work related [2] : 2 [Sudden] : sudden [Burning] : burning [Dull/Aching] : dull/aching [Radiating] : radiating [Constant] : constant [Household chores] : household chores [Social interactions] : social interactions [Leisure] : leisure [Ice] : ice [Standing] : standing [Stairs] : stairs [Not working due to injury] : Work status: not working due to injury [de-identified] : WC 1/5/21: Pt. presents for f/u of her RT foot/ankle. She developed severe neck pain (which is under WC from an injury) on 7/5/23 which caused her to fall and twist her ankle. No previous injury/problem with RT foot/ankle. She has not been to PT, but is not sure if she has been authorized to go for her ankle. She continues to have intermittent pain which is worse with prolonged standing/walking. She wears a compression sleeve for support.  She states that her RNONIE evaluation did not think her ankle was related.  She is OOW due to chronic cervical spine condition.  [] : no [FreeTextEntry1] : rt foot [FreeTextEntry3] : 1-5-21 [FreeTextEntry5] : work incident 2021 but while getting ready for a dr appt had a stabbing in neck went to alleviate that pain and meanwhile twisted ankle 2021 but a smart board fell on pts head  [FreeTextEntry9] : OTC brace as needed  [de-identified] : getting up after sitting too long [de-identified] : has not started PT

## 2024-03-20 ENCOUNTER — APPOINTMENT (OUTPATIENT)
Dept: ORTHOPEDIC SURGERY | Facility: CLINIC | Age: 59
End: 2024-03-20
Payer: OTHER MISCELLANEOUS

## 2024-03-20 PROCEDURE — 20611 DRAIN/INJ JOINT/BURSA W/US: CPT | Mod: 50

## 2024-03-20 PROCEDURE — 99214 OFFICE O/P EST MOD 30 MIN: CPT | Mod: 25

## 2024-03-20 NOTE — HISTORY OF PRESENT ILLNESS
[de-identified] : WC DOI 1/5/2021 1/27/21: bilateral knee made worse after new injury on 1/5/21. 2/24/21: bilateral knee pain persists. 3/24/21: follow up bilateral knees. reports to improvement with pt and injections. still having some mild symptoms but overall better. 5/5/21: bilateral knee pain still present. right worse than left today. 5/26/21: follow up bilateral knees. recent superficial lac on right knee from [place] on 5/22/21 - received 14 sutures in er 6/23/21: bilateral knee pain still. superficial wound right medial knee doing well - sutures removed; cleared by pcp. 7/28/21: bilateral knee pain improved but still has some symptoms. 9/1/21: bilateral knee pain still present. 10/13/21: bilateral knee pain. just restarted pt. 11/24/21: bilateral knee pain persists. PT helps. 1/5/22: bilateral knee pain mildly improved but still present. 2/16/22: bilateral knee pain. 3/2/22: bilateral knee pain. 3/9/22: bilateral knee pain. 3/16/22: bilateral knee pain persists. 4/27/22:  follow up bilateral knees.  some relief with visco but does get flares up with weather changes and prolonged activities.  reports to tightness.  6/8/22:  left knee pain improved.  right knee pain persists. 6/15/22:  right knee pain continues despite csi on right knee last visit.   7/13/22:  no approval for pt.  still intermittent episodes of pain.  8/24/22:  follow up bilateral knees.  still auth pending for pt.  continued tightness and stiffness in knees.  9/21/22:  bilateral knee pain stable.  improves with pt but no auth yet.  11/2/22:  bilateral knee pain still.  right knee worse.  no auth yet. 12/14/22:  follow up bilateral knees.  1/25/23:  csi for left knee last visit.  2/22/23:  follow up bilateral knees.  increased pain after a fall forward on 2/10/22 after getting dizzy.  patient notes she also fell in july 17 2022.  both times she felt dizzy. 4/5/23:  stable knee pain.  mild tightness and stiffness which is intermittent.  reports the csi for right knee did give her relief.  5/17/23:  follow up bilateral knees.  still having pain kati with weather changes.  right knee worse than left today.  5/31/23:  bilateral knee pain.  6/7/23:  bilateral knee pain.  no adverse reactions from first injections.  6/14/23:  bilateral knee pain 6/21/23:  bilateral knee pain persists.  8/2/23:  follow up bilateral knees.  reports the knee pain has increased after a new fall after she had a really bad spasm in the neck on 7/5/23 (patient gets neck spasms and pain). 8/16/23: follow up mri.  still having pain. 10/11/23: bilateral knee pain persists. 11/1523:  bilateral knee pain persists.  right worse than left. 12/13/23:  right knee improved with csi last visit.  left knee mild discomfort.  3/20/24: f/up bilateral knee pain

## 2024-03-20 NOTE — PHYSICAL EXAM
[Positive] : positive Ashley [Left] : left knee [NL (0)] : extension 0 degrees [4___] : quadriceps 4[unfilled]/5 [Equivocal] : equivocal Ashley [5___] : hamstring 5[unfilled]/5 [Right] : right knee [There are no fractures, subluxations or dislocations. No significant abnormalities are seen] : There are no fractures, subluxations or dislocations. No significant abnormalities are seen [Degenerative change] : Degenerative change [] : no erythema [FreeTextEntry8] : still [TWNoteComboBox7] : flexion 100 degrees

## 2024-03-20 NOTE — DISCUSSION/SUMMARY
[de-identified] : hep.  orthovisc bilateral knees  f/up 1 week  Progress Note completed by Shweta Cotto PA-C * Dr. Rosas -- The documentation recorded in this note accurately reflects the decisions made by me during this visit.

## 2024-03-20 NOTE — PROCEDURE
[FreeTextEntry3] : Procedure Name: Orthovisc (Large Joint) with Ultrasound Guidance  Viscosupplementation Injection: X-ray evidence of Osteoarthritis on this or prior visit and Patient has tried OTC's including aspirin, Ibuprofen, Aleve etc or prescription NSAIDS, and/or exercises at home and/ or physical therapy without satisfactory response.  The risks, benefits, and alternatives to Viscosupplementation injection were explained in full to the patient. Risks outlined include but are not limited to infection, sepsis, bleeding, scarring, skin discoloration, temporary increase in pain, syncopal episode, failure to resolve symptoms, allergic reaction, and symptom recurrence. Signs and symptoms of infection reviewed and patient advised to call immediately for redness, fevers, and/or chills. Patient understood the risks. All questions were answered.   Oral informed consent was obtained.   Sterile technique was utilized for the procedure including the preparation of the solutions used for the injection. An injection of Orthovisc 2ml #1 was injected into BILATERAL KNEES.  Patient tolerated the procedure well. Advised to ice the injection site this evening.  Post Procedure Instructions: Patient was advised to call if redness, pain, or fever occur and apply ice for 15 min. out of every hour for the next 12-24 hours as tolerated. patient was advised to rest the joint(s) for 2 days.   Ultrasound Extremity (41571) was used because of the following reasons: inflammation. Ultrasound Guidance was used for the following reasons: for accurate placement of needle into knee joint Diagnostic ultrasound was performed of the knee and is positive for synovitis. Ultrasound guided injection was performed of the knee, visualization of the needle and placement of injection was performed without complication.

## 2024-03-27 ENCOUNTER — APPOINTMENT (OUTPATIENT)
Dept: ORTHOPEDIC SURGERY | Facility: CLINIC | Age: 59
End: 2024-03-27
Payer: OTHER MISCELLANEOUS

## 2024-03-27 PROCEDURE — 99213 OFFICE O/P EST LOW 20 MIN: CPT | Mod: 25

## 2024-03-27 PROCEDURE — 20611 DRAIN/INJ JOINT/BURSA W/US: CPT | Mod: 50

## 2024-03-27 NOTE — PROCEDURE
[FreeTextEntry3] : Procedure Name: Orthovisc (Large Joint) with Ultrasound Guidance  Viscosupplementation Injection: X-ray evidence of Osteoarthritis on this or prior visit and Patient has tried OTC's including aspirin, Ibuprofen, Aleve etc or prescription NSAIDS, and/or exercises at home and/ or physical therapy without satisfactory response.  The risks, benefits, and alternatives to Viscosupplementation injection were explained in full to the patient. Risks outlined include but are not limited to infection, sepsis, bleeding, scarring, skin discoloration, temporary increase in pain, syncopal episode, failure to resolve symptoms, allergic reaction, and symptom recurrence. Signs and symptoms of infection reviewed and patient advised to call immediately for redness, fevers, and/or chills. Patient understood the risks. All questions were answered.   Oral informed consent was obtained.   Sterile technique was utilized for the procedure including the preparation of the solutions used for the injection. An injection of Orthovisc 2ml #2 was injected into BILATERAL KNEES.  Patient tolerated the procedure well. Advised to ice the injection site this evening.  Post Procedure Instructions: Patient was advised to call if redness, pain, or fever occur and apply ice for 15 min. out of every hour for the next 12-24 hours as tolerated. patient was advised to rest the joint(s) for 2 days.   Ultrasound Extremity (74920) was used because of the following reasons: inflammation. Ultrasound Guidance was used for the following reasons: for accurate placement of needle into knee joint Diagnostic ultrasound was performed of the knee and is positive for synovitis. Ultrasound guided injection was performed of the knee, visualization of the needle and placement of injection was performed without complication.

## 2024-03-27 NOTE — HISTORY OF PRESENT ILLNESS
[Work related] : work related [4] : 4 [2] : 2 [Dull/Aching] : dull/aching [Occasional] : occasional [Meds] : meds [Heat] : heat [Disabled] : Work status: disabled [Orthovisc] : Orthovisc [de-identified] : WC DOI 1/5/2021 1/27/21: bilateral knee made worse after new injury on 1/5/21. 2/24/21: bilateral knee pain persists. 3/24/21: follow up bilateral knees. reports to improvement with pt and injections. still having some mild symptoms but overall better. 5/5/21: bilateral knee pain still present. right worse than left today. 5/26/21: follow up bilateral knees. recent superficial lac on right knee from [place] on 5/22/21 - received 14 sutures in er 6/23/21: bilateral knee pain still. superficial wound right medial knee doing well - sutures removed; cleared by pcp. 7/28/21: bilateral knee pain improved but still has some symptoms. 9/1/21: bilateral knee pain still present. 10/13/21: bilateral knee pain. just restarted pt. 11/24/21: bilateral knee pain persists. PT helps. 1/5/22: bilateral knee pain mildly improved but still present. 2/16/22: bilateral knee pain. 3/2/22: bilateral knee pain. 3/9/22: bilateral knee pain. 3/16/22: bilateral knee pain persists. 4/27/22:  follow up bilateral knees.  some relief with visco but does get flares up with weather changes and prolonged activities.  reports to tightness.  6/8/22:  left knee pain improved.  right knee pain persists. 6/15/22:  right knee pain continues despite csi on right knee last visit.   7/13/22:  no approval for pt.  still intermittent episodes of pain.  8/24/22:  follow up bilateral knees.  still auth pending for pt.  continued tightness and stiffness in knees.  9/21/22:  bilateral knee pain stable.  improves with pt but no auth yet.  11/2/22:  bilateral knee pain still.  right knee worse.  no auth yet. 12/14/22:  follow up bilateral knees.  1/25/23:  csi for left knee last visit.  2/22/23:  follow up bilateral knees.  increased pain after a fall forward on 2/10/22 after getting dizzy.  patient notes she also fell in july 17 2022.  both times she felt dizzy. 4/5/23:  stable knee pain.  mild tightness and stiffness which is intermittent.  reports the csi for right knee did give her relief.  5/17/23:  follow up bilateral knees.  still having pain kati with weather changes.  right knee worse than left today.  5/31/23:  bilateral knee pain.  6/7/23:  bilateral knee pain.  no adverse reactions from first injections.  6/14/23:  bilateral knee pain 6/21/23:  bilateral knee pain persists.  8/2/23:  follow up bilateral knees.  reports the knee pain has increased after a new fall after she had a really bad spasm in the neck on 7/5/23 (patient gets neck spasms and pain). 8/16/23: follow up mri.  still having pain. 10/11/23: bilateral knee pain persists. 11/1523:  bilateral knee pain persists.  right worse than left. 12/13/23:  right knee improved with csi last visit.  left knee mild discomfort.  3/20/24: f/up bilateral knee pain 3/27/24 bilateral knee pain, here for Orthovisc [] : no [FreeTextEntry1] : bilateral knees [FreeTextEntry2] : arthritis   [FreeTextEntry3] : 1/5/21 [FreeTextEntry5] : WC [FreeTextEntry6] : numbness  [FreeTextEntry7] : radiating down RT foot  [FreeTextEntry9] : elevation  [de-identified] : activity  [de-identified] : 3/20/24

## 2024-03-27 NOTE — PHYSICAL EXAM
[Positive] : positive Ashley [Left] : left knee [NL (0)] : extension 0 degrees [5___] : hamstring 5[unfilled]/5 [4___] : quadriceps 4[unfilled]/5 [Equivocal] : equivocal Ashley [] : patient ambulates without assistive device [Right] : right knee [There are no fractures, subluxations or dislocations. No significant abnormalities are seen] : There are no fractures, subluxations or dislocations. No significant abnormalities are seen [Degenerative change] : Degenerative change [FreeTextEntry8] : still [TWNoteComboBox7] : flexion 100 degrees

## 2024-03-27 NOTE — DISCUSSION/SUMMARY
[de-identified] : hep.  orthovisc bilateral knees  f/up 1 week  Progress Note completed by Suzy Matos PA-C * Dr. Rosas -- The documentation recorded in this note accurately reflects the decisions made by me during this visit.

## 2024-04-03 ENCOUNTER — APPOINTMENT (OUTPATIENT)
Dept: ORTHOPEDIC SURGERY | Facility: CLINIC | Age: 59
End: 2024-04-03
Payer: OTHER MISCELLANEOUS

## 2024-04-03 VITALS — WEIGHT: 230 LBS | BODY MASS INDEX: 32.93 KG/M2 | HEIGHT: 70 IN

## 2024-04-03 PROCEDURE — 20611 DRAIN/INJ JOINT/BURSA W/US: CPT | Mod: 50

## 2024-04-03 PROCEDURE — 99212 OFFICE O/P EST SF 10 MIN: CPT | Mod: 25

## 2024-04-03 NOTE — PHYSICAL EXAM
[Positive] : positive Ashley [Left] : left knee [NL (0)] : extension 0 degrees [4___] : quadriceps 4[unfilled]/5 [Equivocal] : equivocal Ashley [5___] : hamstring 5[unfilled]/5 [Right] : right knee [Degenerative change] : Degenerative change [There are no fractures, subluxations or dislocations. No significant abnormalities are seen] : There are no fractures, subluxations or dislocations. No significant abnormalities are seen [] : no ecchymosis [FreeTextEntry8] : still [TWNoteComboBox7] : flexion 100 degrees

## 2024-04-03 NOTE — WORK
[Does not reveal pre-existing condition(s) that may affect treatment/prognosis] : does not reveal pre-existing condition(s) that may affect treatment/prognosis [Total (100%)] : total (100%) [Bending/Twisting] : bending/twisting [Cannot return to work because ________] : cannot return to work because [unfilled] [Climbing stairs/Ladders] : climbing stairs/ladders [Kneeling] : kneeling [Walking] : walking [Standing] : standing [Patient] : patient [No Rx restrictions] : No Rx restrictions. [I provided the services listed above] :  I provided the services listed above. 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

## 2024-04-03 NOTE — PROCEDURE
[FreeTextEntry3] : Procedure Name: Orthovisc (Large Joint) with Ultrasound Guidance  Viscosupplementation Injection: X-ray evidence of Osteoarthritis on this or prior visit and Patient has tried OTC's including aspirin, Ibuprofen, Aleve etc or prescription NSAIDS, and/or exercises at home and/ or physical therapy without satisfactory response.  The risks, benefits, and alternatives to Viscosupplementation injection were explained in full to the patient. Risks outlined include but are not limited to infection, sepsis, bleeding, scarring, skin discoloration, temporary increase in pain, syncopal episode, failure to resolve symptoms, allergic reaction, and symptom recurrence. Signs and symptoms of infection reviewed and patient advised to call immediately for redness, fevers, and/or chills. Patient understood the risks. All questions were answered.   Oral informed consent was obtained.   Sterile technique was utilized for the procedure including the preparation of the solutions used for the injection. An injection of Orthovisc 2ml #3 was injected into BILATERAL KNEES.  Patient tolerated the procedure well. Advised to ice the injection site this evening.  Post Procedure Instructions: Patient was advised to call if redness, pain, or fever occur and apply ice for 15 min. out of every hour for the next 12-24 hours as tolerated. patient was advised to rest the joint(s) for 2 days.   Ultrasound Extremity (83167) was used because of the following reasons: inflammation. Ultrasound Guidance was used for the following reasons: for accurate placement of needle into knee joint Diagnostic ultrasound was performed of the knee and is positive for synovitis. Ultrasound guided injection was performed of the knee, visualization of the needle and placement of injection was performed without complication.

## 2024-04-03 NOTE — HISTORY OF PRESENT ILLNESS
[Work related] : work related [4] : 4 [2] : 2 [Dull/Aching] : dull/aching [Occasional] : occasional [Meds] : meds [Heat] : heat [Disabled] : Work status: disabled [Orthovisc] : Orthovisc [de-identified] : WC DOI 1/5/2021 1/27/21: bilateral knee made worse after new injury on 1/5/21. 2/24/21: bilateral knee pain persists. 3/24/21: follow up bilateral knees. reports to improvement with pt and injections. still having some mild symptoms but overall better. 5/5/21: bilateral knee pain still present. right worse than left today. 5/26/21: follow up bilateral knees. recent superficial lac on right knee from [place] on 5/22/21 - received 14 sutures in er 6/23/21: bilateral knee pain still. superficial wound right medial knee doing well - sutures removed; cleared by pcp. 7/28/21: bilateral knee pain improved but still has some symptoms. 9/1/21: bilateral knee pain still present. 10/13/21: bilateral knee pain. just restarted pt. 11/24/21: bilateral knee pain persists. PT helps. 1/5/22: bilateral knee pain mildly improved but still present. 2/16/22: bilateral knee pain. 3/2/22: bilateral knee pain. 3/9/22: bilateral knee pain. 3/16/22: bilateral knee pain persists. 4/27/22:  follow up bilateral knees.  some relief with visco but does get flares up with weather changes and prolonged activities.  reports to tightness.  6/8/22:  left knee pain improved.  right knee pain persists. 6/15/22:  right knee pain continues despite csi on right knee last visit.   7/13/22:  no approval for pt.  still intermittent episodes of pain.  8/24/22:  follow up bilateral knees.  still auth pending for pt.  continued tightness and stiffness in knees.  9/21/22:  bilateral knee pain stable.  improves with pt but no auth yet.  11/2/22:  bilateral knee pain still.  right knee worse.  no auth yet. 12/14/22:  follow up bilateral knees.  1/25/23:  csi for left knee last visit.  2/22/23:  follow up bilateral knees.  increased pain after a fall forward on 2/10/22 after getting dizzy.  patient notes she also fell in july 17 2022.  both times she felt dizzy. 4/5/23:  stable knee pain.  mild tightness and stiffness which is intermittent.  reports the csi for right knee did give her relief.  5/17/23:  follow up bilateral knees.  still having pain kati with weather changes.  right knee worse than left today.  5/31/23:  bilateral knee pain.  6/7/23:  bilateral knee pain.  no adverse reactions from first injections.  6/14/23:  bilateral knee pain 6/21/23:  bilateral knee pain persists.  8/2/23:  follow up bilateral knees.  reports the knee pain has increased after a new fall after she had a really bad spasm in the neck on 7/5/23 (patient gets neck spasms and pain). 8/16/23: follow up mri.  still having pain. 10/11/23: bilateral knee pain persists. 11/1523:  bilateral knee pain persists.  right worse than left. 12/13/23:  right knee improved with csi last visit.  left knee mild discomfort.  3/20/24: f/up bilateral knee pain 3/27/24 bilateral knee pain, here for Orthovisc 4/3/24: bilateral knee pain persists. [] : no [FreeTextEntry1] : bilateral knees [FreeTextEntry2] : arthritis   [FreeTextEntry3] : 1/5/21 [FreeTextEntry6] : numbness  [FreeTextEntry5] : WC [FreeTextEntry7] : radiating down RT foot  [de-identified] : activity  [FreeTextEntry9] : elevation  [de-identified] : 3/20/24

## 2024-04-10 ENCOUNTER — APPOINTMENT (OUTPATIENT)
Dept: ORTHOPEDIC SURGERY | Facility: CLINIC | Age: 59
End: 2024-04-10
Payer: OTHER MISCELLANEOUS

## 2024-04-10 VITALS — HEIGHT: 70 IN | WEIGHT: 230 LBS | BODY MASS INDEX: 32.93 KG/M2

## 2024-04-10 PROCEDURE — 99212 OFFICE O/P EST SF 10 MIN: CPT | Mod: 25

## 2024-04-10 PROCEDURE — 20611 DRAIN/INJ JOINT/BURSA W/US: CPT | Mod: 50

## 2024-04-10 NOTE — HISTORY OF PRESENT ILLNESS
[Gradual] : gradual [Stabbing] : stabbing [Household chores] : household chores [Social interactions] : social interactions [Rest] : rest [Injection therapy] : injection therapy [Walking] : walking [Stairs] : stairs [Orthovisc] : Orthovisc [de-identified] : WC DOI 1/5/2021 1/27/21: bilateral knee made worse after new injury on 1/5/21. 2/24/21: bilateral knee pain persists. 3/24/21: follow up bilateral knees. reports to improvement with pt and injections. still having some mild symptoms but overall better. 5/5/21: bilateral knee pain still present. right worse than left today. 5/26/21: follow up bilateral knees. recent superficial lac on right knee from palm tree on 5/22/21 - received 14 sutures in er 6/23/21: bilateral knee pain still. superficial wound right medial knee doing well - sutures removed; cleared by pcp. 7/28/21: bilateral knee pain improved but still has some symptoms. 9/1/21: bilateral knee pain still present. 10/13/21: bilateral knee pain. just restarted pt. 11/24/21: bilateral knee pain persists. PT helps. 1/5/22: bilateral knee pain mildly improved but still present. 2/16/22: bilateral knee pain. 3/2/22: bilateral knee pain. 3/9/22: bilateral knee pain. 3/16/22: bilateral knee pain persists. 4/27/22:  follow up bilateral knees.  some relief with visco but does get flares up with weather changes and prolonged activities.  reports to tightness.  6/8/22:  left knee pain improved.  right knee pain persists. 6/15/22:  right knee pain continues despite csi on right knee last visit.   7/13/22:  no approval for pt.  still intermittent episodes of pain.  8/24/22:  follow up bilateral knees.  still auth pending for pt.  continued tightness and stiffness in knees.  9/21/22:  bilateral knee pain stable.  improves with pt but no auth yet.  11/2/22:  bilateral knee pain still.  right knee worse.  no auth yet. 12/14/22:  follow up bilateral knees.  1/25/23:  csi for left knee last visit.  2/22/23:  follow up bilateral knees.  increased pain after a fall forward on 2/10/22 after getting dizzy.  patient notes she also fell in july 17 2022.  both times she felt dizzy. 4/5/23:  stable knee pain.  mild tightness and stiffness which is intermittent.  reports the csi for right knee did give her relief.  5/17/23:  follow up bilateral knees.  still having pain kati with weather changes.  right knee worse than left today.  5/31/23:  bilateral knee pain.  6/7/23:  bilateral knee pain.  no adverse reactions from first injections.  6/14/23:  bilateral knee pain 6/21/23:  bilateral knee pain persists.  8/2/23:  follow up bilateral knees.  reports the knee pain has increased after a new fall after she had a really bad spasm in the neck on 7/5/23 (patient gets neck spasms and pain). 8/16/23: follow up mri.  still having pain. 10/11/23: bilateral knee pain persists. 11/1523:  bilateral knee pain persists.  right worse than left. 12/13/23:  right knee improved with csi last visit.  left knee mild discomfort.  3/20/24: f/up bilateral knee pain 3/27/24 bilateral knee pain, here for Orthovisc 4/3/24: bilateral knee pain persists. 4/10/24:  bilatearl knee pain persists. [Work related] : work related [4] : 4 [2] : 2 [Dull/Aching] : dull/aching [Occasional] : occasional [Meds] : meds [Heat] : heat [] : yes [Disabled] : Work status: disabled [FreeTextEntry1] : bilateral knees [FreeTextEntry3] : 1/5/21 [FreeTextEntry2] : arthritis   [FreeTextEntry5] : WC [FreeTextEntry6] : numbness  [FreeTextEntry7] : radiating down RT foot  [FreeTextEntry9] : elevation  [de-identified] : 3/20/24 [de-identified] : activity  [de-identified] : both knees [de-identified] : gel

## 2024-04-10 NOTE — PROCEDURE
[FreeTextEntry3] : Procedure Name: Orthovisc (Large Joint) with Ultrasound Guidance  Viscosupplementation Injection: X-ray evidence of Osteoarthritis on this or prior visit and Patient has tried OTC's including aspirin, Ibuprofen, Aleve etc or prescription NSAIDS, and/or exercises at home and/ or physical therapy without satisfactory response.  The risks, benefits, and alternatives to Viscosupplementation injection were explained in full to the patient. Risks outlined include but are not limited to infection, sepsis, bleeding, scarring, skin discoloration, temporary increase in pain, syncopal episode, failure to resolve symptoms, allergic reaction, and symptom recurrence. Signs and symptoms of infection reviewed and patient advised to call immediately for redness, fevers, and/or chills. Patient understood the risks. All questions were answered.   Oral informed consent was obtained.   Sterile technique was utilized for the procedure including the preparation of the solutions used for the injection. An injection of Orthovisc 2ml #4 was injected into BILATERAL KNEES.  Patient tolerated the procedure well. Advised to ice the injection site this evening.  Post Procedure Instructions: Patient was advised to call if redness, pain, or fever occur and apply ice for 15 min. out of every hour for the next 12-24 hours as tolerated. patient was advised to rest the joint(s) for 2 days.   Ultrasound Extremity (15505) was used because of the following reasons: inflammation. Ultrasound Guidance was used for the following reasons: for accurate placement of needle into knee joint Diagnostic ultrasound was performed of the knee and is positive for synovitis. Ultrasound guided injection was performed of the knee, visualization of the needle and placement of injection was performed without complication.

## 2024-04-25 ENCOUNTER — APPOINTMENT (OUTPATIENT)
Dept: ORTHOPEDIC SURGERY | Facility: CLINIC | Age: 59
End: 2024-04-25

## 2024-05-20 ENCOUNTER — APPOINTMENT (OUTPATIENT)
Dept: ORTHOPEDIC SURGERY | Facility: CLINIC | Age: 59
End: 2024-05-20
Payer: OTHER MISCELLANEOUS

## 2024-05-20 PROCEDURE — 99455 WORK RELATED DISABILITY EXAM: CPT

## 2024-05-20 PROCEDURE — 99243 OFF/OP CNSLTJ NEW/EST LOW 30: CPT

## 2024-05-22 ENCOUNTER — APPOINTMENT (OUTPATIENT)
Dept: ORTHOPEDIC SURGERY | Facility: CLINIC | Age: 59
End: 2024-05-22
Payer: OTHER MISCELLANEOUS

## 2024-05-22 VITALS — WEIGHT: 230 LBS | HEIGHT: 70 IN | BODY MASS INDEX: 32.93 KG/M2

## 2024-05-22 PROCEDURE — 99213 OFFICE O/P EST LOW 20 MIN: CPT

## 2024-05-22 NOTE — PHYSICAL EXAM
[Right] : right knee [Positive] : positive Ashley [Left] : left knee [NL (0)] : extension 0 degrees [4___] : quadriceps 4[unfilled]/5 [5___] : hamstring 5[unfilled]/5 [Equivocal] : equivocal Ashley [] : no erythema [FreeTextEntry8] : still [TWNoteComboBox7] : flexion 100 degrees

## 2024-05-22 NOTE — HISTORY OF PRESENT ILLNESS
[Work related] : work related [Dull/Aching] : dull/aching [Stabbing] : stabbing [Constant] : constant [Household chores] : household chores [Rest] : rest [Standing] : standing [Stairs] : stairs [de-identified] : WC DOI 1/5/2021 1/27/21: bilateral knee made worse after new injury on 1/5/21. 2/24/21: bilateral knee pain persists. 3/24/21: follow up bilateral knees. reports to improvement with pt and injections. still having some mild symptoms but overall better. 5/5/21: bilateral knee pain still present. right worse than left today. 5/26/21: follow up bilateral knees. recent superficial lac on right knee from palm tree on 5/22/21 - received 14 sutures in er 6/23/21: bilateral knee pain still. superficial wound right medial knee doing well - sutures removed; cleared by pcp. 7/28/21: bilateral knee pain improved but still has some symptoms. 9/1/21: bilateral knee pain still present. 10/13/21: bilateral knee pain. just restarted pt. 11/24/21: bilateral knee pain persists. PT helps. 1/5/22: bilateral knee pain mildly improved but still present. 2/16/22: bilateral knee pain. 3/2/22: bilateral knee pain. 3/9/22: bilateral knee pain. 3/16/22: bilateral knee pain persists. 4/27/22:  follow up bilateral knees.  some relief with visco but does get flares up with weather changes and prolonged activities.  reports to tightness.  6/8/22:  left knee pain improved.  right knee pain persists. 6/15/22:  right knee pain continues despite csi on right knee last visit.   7/13/22:  no approval for pt.  still intermittent episodes of pain.  8/24/22:  follow up bilateral knees.  still auth pending for pt.  continued tightness and stiffness in knees.  9/21/22:  bilateral knee pain stable.  improves with pt but no auth yet.  11/2/22:  bilateral knee pain still.  right knee worse.  no auth yet. 12/14/22:  follow up bilateral knees.  1/25/23:  csi for left knee last visit.  2/22/23:  follow up bilateral knees.  increased pain after a fall forward on 2/10/22 after getting dizzy.  patient notes she also fell in july 17 2022.  both times she felt dizzy. 4/5/23:  stable knee pain.  mild tightness and stiffness which is intermittent.  reports the csi for right knee did give her relief.  5/17/23:  follow up bilateral knees.  still having pain kati with weather changes.  right knee worse than left today.  5/31/23:  bilateral knee pain.  6/7/23:  bilateral knee pain.  no adverse reactions from first injections.  6/14/23:  bilateral knee pain 6/21/23:  bilateral knee pain persists.  8/2/23:  follow up bilateral knees.  reports the knee pain has increased after a new fall after she had a really bad spasm in the neck on 7/5/23 (patient gets neck spasms and pain). 8/16/23: follow up mri.  still having pain. 10/11/23: bilateral knee pain persists. 11/1523:  bilateral knee pain persists.  right worse than left. 12/13/23:  right knee improved with csi last visit.  left knee mild discomfort.  3/20/24: f/up bilateral knee pain 3/27/24 bilateral knee pain, here for Orthovisc 4/3/24: bilateral knee pain persists. 4/10/24:  bilatearl knee pain persists. 5/22/24: bilateral knee pain cont  [] : no [FreeTextEntry1] : Stanley Knees [FreeTextEntry3] : 1/5/2021 [FreeTextEntry7] : right back leg [de-identified] : activity [de-identified] : Rt Knee

## 2024-05-22 NOTE — ASSESSMENT
[FreeTextEntry1] : new injury on 1/5/21 when large monitor fell off wall at work and pinned her again the desk after pushing her back. history of knee scopes. knee pain has increased after a new fall after she had a really bad spasm in the neck on 7/5/23.  right knee mri 2021 suggest oa exacerbation right knee with sprain. mri 2023 shows oa, no new large tear. right knee pain a little worse.  csi on 11/15/23 with improvement.    left knee mri 2021 suggests new tear left mm and oa exacerbation. csi on 12/14/22.   has concussion symptoms as well which she is getting treatment for. seeing neuro. patient also notes she has gotten dizzy at least 2 times and had fall in july 2022 and feb 2022 which has caused her to fall. patient had another fall on 7/5/23.  finished Orthovisc series 4/10/24 with some relief

## 2024-05-22 NOTE — DISCUSSION/SUMMARY
[de-identified] : PT and HEP She will follow up in 2 weeks as she is bringing in disability paperwork and would like to discuss this with me

## 2024-05-23 ENCOUNTER — APPOINTMENT (OUTPATIENT)
Dept: ORTHOPEDIC SURGERY | Facility: CLINIC | Age: 59
End: 2024-05-23
Payer: OTHER MISCELLANEOUS

## 2024-05-23 VITALS — WEIGHT: 230 LBS | HEIGHT: 70 IN | BODY MASS INDEX: 32.93 KG/M2

## 2024-05-23 DIAGNOSIS — M25.671 STIFFNESS OF RIGHT ANKLE, NOT ELSEWHERE CLASSIFIED: ICD-10-CM

## 2024-05-23 DIAGNOSIS — R29.898 OTHER SYMPTOMS AND SIGNS INVOLVING THE MUSCULOSKELETAL SYSTEM: ICD-10-CM

## 2024-05-23 DIAGNOSIS — S93.401D SPRAIN OF UNSPECIFIED LIGAMENT OF RIGHT ANKLE, SUBSEQUENT ENCOUNTER: ICD-10-CM

## 2024-05-23 PROCEDURE — 99214 OFFICE O/P EST MOD 30 MIN: CPT

## 2024-05-23 NOTE — REVIEW OF SYSTEMS
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

## 2024-05-23 NOTE — HISTORY OF PRESENT ILLNESS
[Work related] : work related [Sudden] : sudden [4] : 4 [Dull/Aching] : dull/aching [Burning] : burning [Radiating] : radiating [Constant] : constant [Household chores] : household chores [Leisure] : leisure [Social interactions] : social interactions [Ice] : ice [Standing] : standing [Stairs] : stairs [Not working due to injury] : Work status: not working due to injury [de-identified] : WC 1/5/21: Pt. presents for f/u of her RT foot/ankle. She developed severe neck pain (which is under WC from an injury) on 7/5/23 which caused her to fall and twist her ankle. No previous injury/problem with RT foot/ankle. She continues to have intermittent pain which is worse with prolonged standing/walking. She wears a compression sleeve for support.  She states that her RONNIE evaluation did not think her ankle was related.  She is OOW due to chronic cervical spine condition.  [] : no [FreeTextEntry1] : rt foot [FreeTextEntry3] : 1-5-21 [FreeTextEntry5] : work incident 2021 but while getting ready for a dr appt had a stabbing in neck went to alleviate that pain and meanwhile twisted ankle 2021 but a smart board fell on pts head  [FreeTextEntry7] : ankle [FreeTextEntry9] : elevation.brace [de-identified] : getting up after sitting too long [de-identified] : has not started PT

## 2024-05-23 NOTE — DISCUSSION/SUMMARY
[de-identified] : WBAT in supportive footwear. Pt. still recommended a course of PT.  She has a chronic cervical spine condition under WC, developed severe neck pain that caused her to stumble and injure her ankle. It is reasonable that her ankle symptoms are consequential to her initial WC injury.  Ankle treatment is still indicated.   In regard to her ankle, she is able to RTW but has other orthopedic issues that are being managed by other specialists.

## 2024-05-23 NOTE — PHYSICAL EXAM
[Right] : right foot and ankle [Mild] : mild diffused ankle swelling [NL (40)] : plantar flexion 40 degrees [2+] : posterior tibialis pulse: 2+ [Normal] : saphenous nerve sensation normal [5___] : inversion 5[unfilled]/5 [4___] : eversion 4[unfilled]/5 [] : no pain when stressing lateral tarsal metatarsal joint [TWNoteComboBox7] : dorsiflexion 10 degrees [de-identified] : inversion 10 degrees [de-identified] : eversion 10 degrees

## 2024-06-05 ENCOUNTER — APPOINTMENT (OUTPATIENT)
Dept: ORTHOPEDIC SURGERY | Facility: CLINIC | Age: 59
End: 2024-06-05
Payer: OTHER MISCELLANEOUS

## 2024-06-05 VITALS — BODY MASS INDEX: 32.93 KG/M2 | HEIGHT: 70 IN | WEIGHT: 230 LBS

## 2024-06-05 PROCEDURE — 99213 OFFICE O/P EST LOW 20 MIN: CPT

## 2024-06-05 NOTE — HISTORY OF PRESENT ILLNESS
[Work related] : work related [Dull/Aching] : dull/aching [Stabbing] : stabbing [Constant] : constant [Household chores] : household chores [Rest] : rest [Standing] : standing [Stairs] : stairs [de-identified] : WC DOI 1/5/2021 1/27/21: bilateral knee made worse after new injury on 1/5/21. 2/24/21: bilateral knee pain persists. 3/24/21: follow up bilateral knees. reports to improvement with pt and injections. still having some mild symptoms but overall better. 5/5/21: bilateral knee pain still present. right worse than left today. 5/26/21: follow up bilateral knees. recent superficial lac on right knee from [place] tree on 5/22/21 - received 14 sutures in er 6/23/21: bilateral knee pain still. superficial wound right medial knee doing well - sutures removed; cleared by pcp. 7/28/21: bilateral knee pain improved but still has some symptoms. 9/1/21: bilateral knee pain still present. 10/13/21: bilateral knee pain. just restarted pt. 11/24/21: bilateral knee pain persists. PT helps. 1/5/22: bilateral knee pain mildly improved but still present. 2/16/22: bilateral knee pain. 3/2/22: bilateral knee pain. 3/9/22: bilateral knee pain. 3/16/22: bilateral knee pain persists. 4/27/22:  follow up bilateral knees.  some relief with visco but does get flares up with weather changes and prolonged activities.  reports to tightness.  6/8/22:  left knee pain improved.  right knee pain persists. 6/15/22:  right knee pain continues despite csi on right knee last visit.   7/13/22:  no approval for pt.  still intermittent episodes of pain.  8/24/22:  follow up bilateral knees.  still auth pending for pt.  continued tightness and stiffness in knees.  9/21/22:  bilateral knee pain stable.  improves with pt but no auth yet.  11/2/22:  bilateral knee pain still.  right knee worse.  no auth yet. 12/14/22:  follow up bilateral knees.  1/25/23:  csi for left knee last visit.  2/22/23:  follow up bilateral knees.  increased pain after a fall forward on 2/10/22 after getting dizzy.  patient notes she also fell in july 17 2022.  both times she felt dizzy. 4/5/23:  stable knee pain.  mild tightness and stiffness which is intermittent.  reports the csi for right knee did give her relief.  5/17/23:  follow up bilateral knees.  still having pain kati with weather changes.  right knee worse than left today.  5/31/23:  bilateral knee pain.  6/7/23:  bilateral knee pain.  no adverse reactions from first injections.  6/14/23:  bilateral knee pain 6/21/23:  bilateral knee pain persists.  8/2/23:  follow up bilateral knees.  reports the knee pain has increased after a new fall after she had a really bad spasm in the neck on 7/5/23 (patient gets neck spasms and pain). 8/16/23: follow up mri.  still having pain. 10/11/23: bilateral knee pain persists. 11/1523:  bilateral knee pain persists.  right worse than left. 12/13/23:  right knee improved with csi last visit.  left knee mild discomfort.  3/20/24: f/up bilateral knee pain 3/27/24 bilateral knee pain, here for Orthovisc 4/3/24: bilateral knee pain persists. 4/10/24:  bilatearl knee pain persists. 5/22/24: bilateral knee pain cont  6/5/24: bilateral knee pain persists. right knee buckling. [] : no [FreeTextEntry1] : Stanley Knees [FreeTextEntry3] : 1/5/2021 [de-identified] : activity [de-identified] : Rt Knee

## 2024-06-05 NOTE — ASSESSMENT
[FreeTextEntry1] : new injury on 1/5/21 when large monitor fell off wall at work and pinned her again the desk after pushing her back. history of knee scopes. knee pain has increased after a new fall after she had a really bad spasm in the neck on 7/5/23.  mri 2023 shows oa, visco helps but more giving way recently.  possible new mmt.  left knee mri 2021 suggests new tear left mm and oa exacerbation. visco helps.  success with visco in the past.  has concussion symptoms as well.  seeing neuro. patient also notes she has gotten dizzy at least 2 times and had fall in july 2022 and feb 2022 which has caused her to fall. patient had another fall on 7/5/23.

## 2024-06-05 NOTE — DISCUSSION/SUMMARY
[de-identified] : request mri right knee to rule out new meniscus tear, loose body. request PT. follow up after mri.

## 2024-06-14 ENCOUNTER — RESULT REVIEW (OUTPATIENT)
Age: 59
End: 2024-06-14

## 2024-06-19 ENCOUNTER — APPOINTMENT (OUTPATIENT)
Dept: ORTHOPEDIC SURGERY | Facility: CLINIC | Age: 59
End: 2024-06-19
Payer: OTHER MISCELLANEOUS

## 2024-06-19 VITALS — BODY MASS INDEX: 32.93 KG/M2 | HEIGHT: 70 IN | WEIGHT: 230 LBS

## 2024-06-19 DIAGNOSIS — M17.11 UNILATERAL PRIMARY OSTEOARTHRITIS, RIGHT KNEE: ICD-10-CM

## 2024-06-19 DIAGNOSIS — M17.12 UNILATERAL PRIMARY OSTEOARTHRITIS, LEFT KNEE: ICD-10-CM

## 2024-06-19 PROCEDURE — 20611 DRAIN/INJ JOINT/BURSA W/US: CPT | Mod: RT

## 2024-06-19 PROCEDURE — 99214 OFFICE O/P EST MOD 30 MIN: CPT | Mod: 25

## 2024-06-19 NOTE — DATA REVIEWED
[MRI] : MRI [Right] : of the right [Knee] : knee [Report was reviewed and noted in the chart] : The report was reviewed and noted in the chart [I reviewed the films/CD and agree] : I reviewed the films/CD and agree [FreeTextEntry1] : looks like small tear mm.  oa present medial and pf the worse.

## 2024-06-19 NOTE — ASSESSMENT
[FreeTextEntry1] : new injury on 1/5/21 when large monitor fell off wall at work and pinned her again the desk after pushing her back. history of knee scopes.  right knee pain.  mri spring 2024 shows oa and possible small mmt.  some success with visco  left knee pain. mri 2021 suggests new tear left mm and oa exacerbation. visco helps.  has concussion symptoms as well.  seeing neuro. patient also notes she has gotten dizzy at least 2 times and had fall in july 2022 and feb 2022 which has caused her to fall. patient had another fall on 7/5/23.

## 2024-06-19 NOTE — PHYSICAL EXAM
[Right] : right knee [Positive] : positive Ashley [Left] : left knee [NL (0)] : extension 0 degrees [4___] : quadriceps 4[unfilled]/5 [5___] : hamstring 5[unfilled]/5 [Equivocal] : equivocal Ashley [] : patient ambulates without assistive device [FreeTextEntry8] : still [TWNoteComboBox7] : flexion 110 degrees

## 2024-06-19 NOTE — HISTORY OF PRESENT ILLNESS
[Work related] : work related [8] : 8 [2] : 2 [Dull/Aching] : dull/aching [Stabbing] : stabbing [Constant] : constant [Household chores] : household chores [Rest] : rest [Standing] : standing [Stairs] : stairs [de-identified] : WC DOI 1/5/2021 1/27/21: bilateral knee made worse after new injury on 1/5/21. 2/24/21: bilateral knee pain persists. 3/24/21: follow up bilateral knees. reports to improvement with pt and injections. still having some mild symptoms but overall better. 5/5/21: bilateral knee pain still present. right worse than left today. 5/26/21: follow up bilateral knees. recent superficial lac on right knee from [place] on 5/22/21 - received 14 sutures in er 6/23/21: bilateral knee pain still. superficial wound right medial knee doing well - sutures removed; cleared by pcp. 7/28/21: bilateral knee pain improved but still has some symptoms. 9/1/21: bilateral knee pain still present. 10/13/21: bilateral knee pain. just restarted pt. 11/24/21: bilateral knee pain persists. PT helps. 1/5/22: bilateral knee pain mildly improved but still present. 2/16/22: bilateral knee pain. 3/2/22: bilateral knee pain. 3/9/22: bilateral knee pain. 3/16/22: bilateral knee pain persists. 4/27/22:  follow up bilateral knees.  some relief with visco but does get flares up with weather changes and prolonged activities.  reports to tightness.  6/8/22:  left knee pain improved.  right knee pain persists. 6/15/22:  right knee pain continues despite csi on right knee last visit.   7/13/22:  no approval for pt.  still intermittent episodes of pain.  8/24/22:  follow up bilateral knees.  still auth pending for pt.  continued tightness and stiffness in knees.  9/21/22:  bilateral knee pain stable.  improves with pt but no auth yet.  11/2/22:  bilateral knee pain still.  right knee worse.  no auth yet. 12/14/22:  follow up bilateral knees.  1/25/23:  csi for left knee last visit.  2/22/23:  follow up bilateral knees.  increased pain after a fall forward on 2/10/22 after getting dizzy.  patient notes she also fell in july 17 2022.  both times she felt dizzy. 4/5/23:  stable knee pain.  mild tightness and stiffness which is intermittent.  reports the csi for right knee did give her relief.  5/17/23:  follow up bilateral knees.  still having pain kati with weather changes.  right knee worse than left today.  5/31/23:  bilateral knee pain.  6/7/23:  bilateral knee pain.  no adverse reactions from first injections.  6/14/23:  bilateral knee pain 6/21/23:  bilateral knee pain persists.  8/2/23:  follow up bilateral knees.  reports the knee pain has increased after a new fall after she had a really bad spasm in the neck on 7/5/23 (patient gets neck spasms and pain). 8/16/23: follow up mri.  still having pain. 10/11/23: bilateral knee pain persists. 11/1523:  bilateral knee pain persists.  right worse than left. 12/13/23:  right knee improved with csi last visit.  left knee mild discomfort.  3/20/24: f/up bilateral knee pain 3/27/24 bilateral knee pain, here for Orthovisc 4/3/24: bilateral knee pain persists. 4/10/24:  bilatearl knee pain persists. 5/22/24: bilateral knee pain cont  6/5/24: bilateral knee pain persists. right knee buckling. 6/19/24:  right knee pain still worse.  left knee pain persists as well. [] : no [FreeTextEntry1] : right Knees [FreeTextEntry3] : 1/5/2021 [FreeTextEntry6] : Discomfort [FreeTextEntry9] : Diclofenac, sleeping with bent legs [de-identified] : activity [de-identified] : MRI @ [facility]

## 2024-07-11 ENCOUNTER — APPOINTMENT (OUTPATIENT)
Dept: ORTHOPEDIC SURGERY | Facility: CLINIC | Age: 59
End: 2024-07-11

## 2024-07-31 ENCOUNTER — APPOINTMENT (OUTPATIENT)
Dept: ORTHOPEDIC SURGERY | Facility: CLINIC | Age: 59
End: 2024-07-31
Payer: OTHER MISCELLANEOUS

## 2024-07-31 VITALS — BODY MASS INDEX: 32.21 KG/M2 | WEIGHT: 225 LBS | HEIGHT: 70 IN

## 2024-07-31 DIAGNOSIS — M17.11 UNILATERAL PRIMARY OSTEOARTHRITIS, RIGHT KNEE: ICD-10-CM

## 2024-07-31 DIAGNOSIS — M17.12 UNILATERAL PRIMARY OSTEOARTHRITIS, LEFT KNEE: ICD-10-CM

## 2024-07-31 PROCEDURE — 99214 OFFICE O/P EST MOD 30 MIN: CPT

## 2024-07-31 NOTE — DISCUSSION/SUMMARY
[de-identified] : diclofenac rx from pain management  HEP and PT  f/up 6 weeks   The patient's orthopaedic condition(s) warrants consideration of consistent or intermittent use of a prescription strength non-steroidal anti-inflammatory medication.  These medications are associated with risks including but not limited to gastrointestinal irritation, kidney damage, hypertension, and bleeding. The patient notes they already have a valid prescription for the medication. The patient understands the risks and will take medications as prescribed previously.  The patient will stop the medication and consult a physician as needed if problems arise.

## 2024-07-31 NOTE — PHYSICAL EXAM
[Right] : right knee [Positive] : positive Ashley [Left] : left knee [NL (0)] : extension 0 degrees [4___] : quadriceps 4[unfilled]/5 [5___] : hamstring 5[unfilled]/5 [Equivocal] : equivocal Ashley [] : no erythema [FreeTextEntry8] : still [TWNoteComboBox7] : flexion 110 degrees

## 2024-07-31 NOTE — ASSESSMENT
[FreeTextEntry1] : new injury on 1/5/21 when large monitor fell off wall at work and pinned her again the desk after pushing her back. history of knee scopes.  right knee pain.  mri spring 2024 shows oa and possible small mmt.  some success with visco (last 4/10/24). good relief with CSI (last 6/19/24)  left knee pain. mri 2021 suggests new tear left mm and oa exacerbation. visco helps (last 4/10/24)..  has concussion symptoms as well.  seeing neuro. patient also notes she has gotten dizzy at least 2 times and had fall in july 2022 and feb 2022 which has caused her to fall. patient had another fall on 7/5/23.

## 2024-07-31 NOTE — HISTORY OF PRESENT ILLNESS
[Work related] : work related [7] : 7 [4] : 4 [Dull/Aching] : dull/aching [Sharp] : sharp [Intermittent] : intermittent [Household chores] : household chores [Leisure] : leisure [Work] : work [Rest] : rest [Injection therapy] : injection therapy [Stairs] : stairs [Not working due to injury] : Work status: not working due to injury [de-identified] : WC DOI 1/5/2021 1/27/21: bilateral knee made worse after new injury on 1/5/21. 2/24/21: bilateral knee pain persists. 3/24/21: follow up bilateral knees. reports to improvement with pt and injections. still having some mild symptoms but overall better. 5/5/21: bilateral knee pain still present. right worse than left today. 5/26/21: follow up bilateral knees. recent superficial lac on right knee from palm tree on 5/22/21 - received 14 sutures in er 6/23/21: bilateral knee pain still. superficial wound right medial knee doing well - sutures removed; cleared by pcp. 7/28/21: bilateral knee pain improved but still has some symptoms. 9/1/21: bilateral knee pain still present. 10/13/21: bilateral knee pain. just restarted pt. 11/24/21: bilateral knee pain persists. PT helps. 1/5/22: bilateral knee pain mildly improved but still present. 2/16/22: bilateral knee pain. 3/2/22: bilateral knee pain. 3/9/22: bilateral knee pain. 3/16/22: bilateral knee pain persists. 4/27/22:  follow up bilateral knees.  some relief with visco but does get flares up with weather changes and prolonged activities.  reports to tightness.  6/8/22:  left knee pain improved.  right knee pain persists. 6/15/22:  right knee pain continues despite csi on right knee last visit.   7/13/22:  no approval for pt.  still intermittent episodes of pain.  8/24/22:  follow up bilateral knees.  still auth pending for pt.  continued tightness and stiffness in knees.  9/21/22:  bilateral knee pain stable.  improves with pt but no auth yet.  11/2/22:  bilateral knee pain still.  right knee worse.  no auth yet. 12/14/22:  follow up bilateral knees.  1/25/23:  csi for left knee last visit.  2/22/23:  follow up bilateral knees.  increased pain after a fall forward on 2/10/22 after getting dizzy.  patient notes she also fell in july 17 2022.  both times she felt dizzy. 4/5/23:  stable knee pain.  mild tightness and stiffness which is intermittent.  reports the csi for right knee did give her relief.  5/17/23:  follow up bilateral knees.  still having pain kati with weather changes.  right knee worse than left today.  5/31/23:  bilateral knee pain.  6/7/23:  bilateral knee pain.  no adverse reactions from first injections.  6/14/23:  bilateral knee pain 6/21/23:  bilateral knee pain persists.  8/2/23:  follow up bilateral knees.  reports the knee pain has increased after a new fall after she had a really bad spasm in the neck on 7/5/23 (patient gets neck spasms and pain). 8/16/23: follow up mri.  still having pain. 10/11/23: bilateral knee pain persists. 11/1523:  bilateral knee pain persists.  right worse than left. 12/13/23:  right knee improved with csi last visit.  left knee mild discomfort.  3/20/24: f/up bilateral knee pain 3/27/24 bilateral knee pain, here for Orthovisc 4/3/24: bilateral knee pain persists. 4/10/24:  bilatearl knee pain persists. 5/22/24: bilateral knee pain cont  6/5/24: bilateral knee pain persists. right knee buckling. 6/19/24:  right knee pain still worse.  left knee pain persists as well. 7/31/24: f/u bilateral knees. R knee CSI from last visit helped  [] : no [FreeTextEntry1] : [facility] knees [FreeTextEntry3] : 1/5/2021 [FreeTextEntry9] : CSI [de-identified] : starting PT on Friday 8/2/2024

## 2024-09-09 ENCOUNTER — APPOINTMENT (OUTPATIENT)
Dept: UROLOGY | Facility: CLINIC | Age: 59
End: 2024-09-09
Payer: MEDICARE

## 2024-09-09 DIAGNOSIS — N32.81 OVERACTIVE BLADDER: ICD-10-CM

## 2024-09-09 PROCEDURE — 99213 OFFICE O/P EST LOW 20 MIN: CPT

## 2024-09-09 RX ORDER — LISDEXAMFETAMINE 30 MG/1
30 CAPSULE ORAL DAILY
Qty: 30 | Refills: 0 | Status: ACTIVE | COMMUNITY
Start: 2024-09-09

## 2024-09-09 RX ORDER — TIRZEPATIDE 10 MG/.5ML
10 INJECTION, SOLUTION SUBCUTANEOUS
Qty: 1 | Refills: 0 | Status: ACTIVE | COMMUNITY
Start: 2024-09-09

## 2024-09-09 NOTE — HISTORY OF PRESENT ILLNESS
[FreeTextEntry1] : The patient is on solifenacin.  no real complaints except she has occasional urgency. no hematuria. no urgency. no post void fullness.  no constipation, She does have dry mouth but she is able to get by with frequent small sips of water.

## 2024-09-09 NOTE — LETTER BODY
[Dear  ___] : Dear  [unfilled], [Courtesy Letter:] : I had the pleasure of seeing your patient, [unfilled], in my office today. [Please see my note below.] : Please see my note below. [Sincerely,] : Sincerely, [FreeTextEntry3] : Ed  Ridge Cisneros MD [facility]  [facility] [employer] [facility]

## 2024-09-09 NOTE — ASSESSMENT
[FreeTextEntry1] : Impression:  OAB with good control.   Plan;  continue solifenacin.  Follow up 6 months.

## 2024-09-11 ENCOUNTER — APPOINTMENT (OUTPATIENT)
Dept: ORTHOPEDIC SURGERY | Facility: CLINIC | Age: 59
End: 2024-09-11
Payer: OTHER MISCELLANEOUS

## 2024-09-11 VITALS — BODY MASS INDEX: 32.21 KG/M2 | HEIGHT: 70 IN | WEIGHT: 225 LBS

## 2024-09-11 DIAGNOSIS — M17.11 UNILATERAL PRIMARY OSTEOARTHRITIS, RIGHT KNEE: ICD-10-CM

## 2024-09-11 DIAGNOSIS — M17.12 UNILATERAL PRIMARY OSTEOARTHRITIS, LEFT KNEE: ICD-10-CM

## 2024-09-11 PROCEDURE — 99214 OFFICE O/P EST MOD 30 MIN: CPT

## 2024-09-11 RX ORDER — LISDEXAMFETAMINE DIMESYLATE 30 MG/1
30 CAPSULE ORAL
Refills: 0 | Status: ACTIVE | COMMUNITY

## 2024-09-11 NOTE — DISCUSSION/SUMMARY
[de-identified] : diclofenac rx from pain management  HEP and PT  Will request auth for repeat bilateral knee Orthovisc series  f/up 6 weeks  The patient's orthopaedic condition(s) warrants consideration of consistent or intermittent use of a prescription strength non-steroidal anti-inflammatory medication.  These medications are associated with risks including but not limited to gastrointestinal irritation, kidney damage, hypertension, and bleeding. The patient notes they already have a valid prescription for the medication. The patient understands the risks and will take medications as prescribed previously.  The patient will stop the medication and consult a physician as needed if problems arise.

## 2024-09-11 NOTE — HISTORY OF PRESENT ILLNESS
[Work related] : work related [7] : 7 [Dull/Aching] : dull/aching [Sharp] : sharp [Intermittent] : intermittent [Household chores] : household chores [Leisure] : leisure [Work] : work [Rest] : rest [Injection therapy] : injection therapy [Stairs] : stairs [2] : 2 [Stabbing] : stabbing [Retired] : Work status: retired [de-identified] : WC DOI 1/5/2021 1/27/21: bilateral knee made worse after new injury on 1/5/21. 2/24/21: bilateral knee pain persists. 3/24/21: follow up bilateral knees. reports to improvement with pt and injections. still having some mild symptoms but overall better. 5/5/21: bilateral knee pain still present. right worse than left today. 5/26/21: follow up bilateral knees. recent superficial lac on right knee from palm tree on 5/22/21 - received 14 sutures in er 6/23/21: bilateral knee pain still. superficial wound right medial knee doing well - sutures removed; cleared by pcp. 7/28/21: bilateral knee pain improved but still has some symptoms. 9/1/21: bilateral knee pain still present. 10/13/21: bilateral knee pain. just restarted pt. 11/24/21: bilateral knee pain persists. PT helps. 1/5/22: bilateral knee pain mildly improved but still present. 2/16/22: bilateral knee pain. 3/2/22: bilateral knee pain. 3/9/22: bilateral knee pain. 3/16/22: bilateral knee pain persists. 4/27/22:  follow up bilateral knees.  some relief with visco but does get flares up with weather changes and prolonged activities.  reports to tightness.  6/8/22:  left knee pain improved.  right knee pain persists. 6/15/22:  right knee pain continues despite csi on right knee last visit.   7/13/22:  no approval for pt.  still intermittent episodes of pain.  8/24/22:  follow up bilateral knees.  still auth pending for pt.  continued tightness and stiffness in knees.  9/21/22:  bilateral knee pain stable.  improves with pt but no auth yet.  11/2/22:  bilateral knee pain still.  right knee worse.  no auth yet. 12/14/22:  follow up bilateral knees.  1/25/23:  csi for left knee last visit.  2/22/23:  follow up bilateral knees.  increased pain after a fall forward on 2/10/22 after getting dizzy.  patient notes she also fell in july 17 2022.  both times she felt dizzy. 4/5/23:  stable knee pain.  mild tightness and stiffness which is intermittent.  reports the csi for right knee did give her relief.  5/17/23:  follow up bilateral knees.  still having pain kati with weather changes.  right knee worse than left today.  5/31/23:  bilateral knee pain.  6/7/23:  bilateral knee pain.  no adverse reactions from first injections.  6/14/23:  bilateral knee pain 6/21/23:  bilateral knee pain persists.  8/2/23:  follow up bilateral knees.  reports the knee pain has increased after a new fall after she had a really bad spasm in the neck on 7/5/23 (patient gets neck spasms and pain). 8/16/23: follow up mri.  still having pain. 10/11/23: bilateral knee pain persists. 11/1523:  bilateral knee pain persists.  right worse than left. 12/13/23:  right knee improved with csi last visit.  left knee mild discomfort.  3/20/24: f/up bilateral knee pain 3/27/24 bilateral knee pain, here for Orthovisc 4/3/24: bilateral knee pain persists. 4/10/24:  bilatearl knee pain persists. 5/22/24: bilateral knee pain cont  6/5/24: bilateral knee pain persists. right knee buckling. 6/19/24:  right knee pain still worse.  left knee pain persists as well. 7/31/24: f/u bilateral knees. R knee CSI from last visit helped  9/11/24: f/up joseline knees. has done 4 sessions of PT.  [] : no [FreeTextEntry1] : [facility] knees [FreeTextEntry3] : 1/5/2021 [FreeTextEntry9] : CSI [de-identified] : getting into bed [de-identified] : PT

## 2024-09-11 NOTE — ASSESSMENT
[FreeTextEntry1] : new injury on 1/5/21 when large monitor fell off wall at work and pinned her again the desk after pushing her back. history of knee scopes.  right knee pain.  mri spring 2024 shows oa and possible small mmt.  some success with visco- Orthovisc (last 4/10/24). good relief with CSI (last 6/19/24)  left knee pain. mri 2021 suggests new tear left mm and oa exacerbation. visco helps- Orthovisc (last 4/10/24).  has concussion symptoms as well.  seeing neuro. patient also notes she has gotten dizzy at least 2 times and had fall in july 2022 and feb 2022 which has caused her to fall. patient had another fall on 7/5/23.

## 2024-10-23 ENCOUNTER — APPOINTMENT (OUTPATIENT)
Dept: ORTHOPEDIC SURGERY | Facility: CLINIC | Age: 59
End: 2024-10-23
Payer: OTHER MISCELLANEOUS

## 2024-10-23 VITALS — BODY MASS INDEX: 32.21 KG/M2 | WEIGHT: 225 LBS | HEIGHT: 70 IN

## 2024-10-23 PROCEDURE — 20611 DRAIN/INJ JOINT/BURSA W/US: CPT | Mod: 50

## 2024-10-30 ENCOUNTER — APPOINTMENT (OUTPATIENT)
Dept: ORTHOPEDIC SURGERY | Facility: CLINIC | Age: 59
End: 2024-10-30
Payer: OTHER MISCELLANEOUS

## 2024-10-30 PROCEDURE — 20611 DRAIN/INJ JOINT/BURSA W/US: CPT | Mod: 50

## 2024-11-06 ENCOUNTER — APPOINTMENT (OUTPATIENT)
Dept: ORTHOPEDIC SURGERY | Facility: CLINIC | Age: 59
End: 2024-11-06
Payer: OTHER MISCELLANEOUS

## 2024-11-06 ENCOUNTER — OFFICE (OUTPATIENT)
Dept: URBAN - METROPOLITAN AREA CLINIC 105 | Facility: CLINIC | Age: 59
Setting detail: OPHTHALMOLOGY
End: 2024-11-06
Payer: MEDICARE

## 2024-11-06 VITALS — HEIGHT: 70 IN | WEIGHT: 225 LBS | BODY MASS INDEX: 32.21 KG/M2

## 2024-11-06 DIAGNOSIS — H52.223: ICD-10-CM

## 2024-11-06 DIAGNOSIS — G93.2: ICD-10-CM

## 2024-11-06 DIAGNOSIS — H55.00: ICD-10-CM

## 2024-11-06 DIAGNOSIS — H40.003: ICD-10-CM

## 2024-11-06 DIAGNOSIS — H40.033: ICD-10-CM

## 2024-11-06 DIAGNOSIS — H52.7: ICD-10-CM

## 2024-11-06 DIAGNOSIS — H25.13: ICD-10-CM

## 2024-11-06 DIAGNOSIS — S06.0X0S: ICD-10-CM

## 2024-11-06 PROCEDURE — 92250 FUNDUS PHOTOGRAPHY W/I&R: CPT | Performed by: STUDENT IN AN ORGANIZED HEALTH CARE EDUCATION/TRAINING PROGRAM

## 2024-11-06 PROCEDURE — 92012 INTRM OPH EXAM EST PATIENT: CPT | Performed by: STUDENT IN AN ORGANIZED HEALTH CARE EDUCATION/TRAINING PROGRAM

## 2024-11-06 PROCEDURE — 92015 DETERMINE REFRACTIVE STATE: CPT | Performed by: STUDENT IN AN ORGANIZED HEALTH CARE EDUCATION/TRAINING PROGRAM

## 2024-11-06 PROCEDURE — 20611 DRAIN/INJ JOINT/BURSA W/US: CPT | Mod: 50

## 2024-11-06 ASSESSMENT — REFRACTION_MANIFEST
OD_VA1: 20/20
OD_CYLINDER: -0.75
OD_VA1: 20/20
OS_ADD: +1.75
OD_HPRISM: 1
OD_CYLINDER: -0.75
OD_SPHERE: +3.25
OS_AXIS: 108
OD_VPRISM: BD
OS_CYLINDER: -1.00
OS_VA1: 20/20
OD_SPHERE: +5.25
OS_CYLINDER: -0.50
OS_AXIS: 105
OS_SPHERE: +3.00
OD_CYLINDER: -0.75
OD_AXIS: 075
OS_CYLINDER: -0.50
OS_VA1: 20/20
OD_AXIS: 080
OD_CYLINDER: -0.75
OD_ADD: +1.75
OD_SPHERE: +2.75
OD_VPRISM_DIRECTION: BI
OS_VPRISM_DIRECTION: BI
OS_SPHERE: +2.00
OD_SPHERE: +2.50
OS_CYLINDER: -0.50
OS_AXIS: 100
OS_VPRISM: BU
OS_HPRISM: 1
OS_SPHERE: +2.00
OS_SPHERE: +4.75
OD_AXIS: 075
OD_AXIS: 075
OS_AXIS: 105

## 2024-11-06 ASSESSMENT — KERATOMETRY
OD_K2POWER_DIOPTERS: 42.25
OD_K1POWER_DIOPTERS: 41.75
OS_K2POWER_DIOPTERS: 42.25
OS_K1POWER_DIOPTERS: 42.25
OD_AXISANGLE_DEGREES: 087
OS_AXISANGLE_DEGREES: 090

## 2024-11-06 ASSESSMENT — REFRACTION_CURRENTRX
OS_SPHERE: +4.50
OS_SPHERE: +1.75
OD_AXIS: 075
OS_AXIS: 089
OS_VPRISM_DIRECTION: SV
OD_SPHERE: +5.00
OD_AXIS: 050
OS_CYLINDER: -0.25
OS_CYLINDER: SPH
OS_OVR_VA: 20/
OS_OVR_VA: 20/
OS_VPRISM_DIRECTION: SV
OD_SPHERE: +4.75
OD_SPHERE: +2.50
OS_AXIS: 108
OS_AXIS: 110
OS_AXIS: 105
OD_OVR_VA: 20/
OS_VPRISM_DIRECTION: SV
OD_CYLINDER: -0.50
OS_HPRISM: 1
OD_CYLINDER: SPH
OS_SPHERE: +1.75
OD_AXIS: 080
OS_SPHERE: +4.25
OS_OVR_VA: 20/
OD_OVR_VA: 20/
OD_CYLINDER: -0.50
OD_VPRISM_DIRECTION: SV
OD_CYLINDER: -0.50
OD_SPHERE: +1.75
OS_CYLINDER: -0.25
OD_CYLINDER: -0.50
OS_SPHERE: +2.50
OD_SPHERE: +1.75
OS_CYLINDER: -0.25
OD_HPRISM: 1
OD_OVR_VA: 20/
OD_VPRISM_DIRECTION: SV
OD_VPRISM_DIRECTION: SV
OS_CYLINDER: -0.25
OD_AXIS: 049

## 2024-11-06 ASSESSMENT — REFRACTION_AUTOREFRACTION
OS_AXIS: 108
OD_SPHERE: +3.25
OD_CYLINDER: -0.75
OD_AXIS: 080
OS_SPHERE: +3.00
OS_CYLINDER: -1.00

## 2024-11-06 ASSESSMENT — TONOMETRY: OS_IOP_MMHG: 21

## 2024-11-06 ASSESSMENT — PACHYMETRY
OD_CT_UM: 610
OD_CT_CORRECTION: -5
OS_CT_UM: 610
OS_CT_CORRECTION: -5

## 2024-11-06 ASSESSMENT — VISUAL ACUITY
OS_BCVA: 20/40
OD_BCVA: 20/25-1

## 2024-11-13 ENCOUNTER — APPOINTMENT (OUTPATIENT)
Dept: ORTHOPEDIC SURGERY | Facility: CLINIC | Age: 59
End: 2024-11-13
Payer: OTHER MISCELLANEOUS

## 2024-11-13 VITALS — BODY MASS INDEX: 32.21 KG/M2 | WEIGHT: 225 LBS | HEIGHT: 70 IN

## 2024-11-13 DIAGNOSIS — M17.12 UNILATERAL PRIMARY OSTEOARTHRITIS, LEFT KNEE: ICD-10-CM

## 2024-11-13 DIAGNOSIS — M17.11 UNILATERAL PRIMARY OSTEOARTHRITIS, RIGHT KNEE: ICD-10-CM

## 2024-11-13 PROCEDURE — 20611 DRAIN/INJ JOINT/BURSA W/US: CPT | Mod: 50

## 2025-01-08 ENCOUNTER — APPOINTMENT (OUTPATIENT)
Dept: ORTHOPEDIC SURGERY | Facility: CLINIC | Age: 60
End: 2025-01-08
Payer: OTHER MISCELLANEOUS

## 2025-01-08 VITALS — HEIGHT: 70 IN | WEIGHT: 225 LBS | BODY MASS INDEX: 32.21 KG/M2

## 2025-01-08 DIAGNOSIS — M17.11 UNILATERAL PRIMARY OSTEOARTHRITIS, RIGHT KNEE: ICD-10-CM

## 2025-01-08 DIAGNOSIS — M17.12 UNILATERAL PRIMARY OSTEOARTHRITIS, LEFT KNEE: ICD-10-CM

## 2025-01-08 PROCEDURE — 99213 OFFICE O/P EST LOW 20 MIN: CPT

## 2025-02-19 ENCOUNTER — APPOINTMENT (OUTPATIENT)
Dept: ORTHOPEDIC SURGERY | Facility: CLINIC | Age: 60
End: 2025-02-19
Payer: OTHER MISCELLANEOUS

## 2025-02-19 VITALS — HEIGHT: 70 IN | WEIGHT: 225 LBS | BODY MASS INDEX: 32.21 KG/M2

## 2025-02-19 DIAGNOSIS — M17.12 UNILATERAL PRIMARY OSTEOARTHRITIS, LEFT KNEE: ICD-10-CM

## 2025-02-19 DIAGNOSIS — M17.11 UNILATERAL PRIMARY OSTEOARTHRITIS, RIGHT KNEE: ICD-10-CM

## 2025-02-19 PROCEDURE — J3490M: CUSTOM

## 2025-02-19 PROCEDURE — 99213 OFFICE O/P EST LOW 20 MIN: CPT | Mod: 25

## 2025-02-19 PROCEDURE — 20611 DRAIN/INJ JOINT/BURSA W/US: CPT | Mod: RT

## 2025-03-11 ENCOUNTER — OFFICE (OUTPATIENT)
Dept: URBAN - METROPOLITAN AREA CLINIC 105 | Facility: CLINIC | Age: 60
Setting detail: OPHTHALMOLOGY
End: 2025-03-11
Payer: MEDICARE

## 2025-03-11 DIAGNOSIS — H40.003: ICD-10-CM

## 2025-03-11 PROCEDURE — 92133 CPTRZD OPH DX IMG PST SGM ON: CPT | Performed by: STUDENT IN AN ORGANIZED HEALTH CARE EDUCATION/TRAINING PROGRAM

## 2025-03-11 PROCEDURE — 92083 EXTENDED VISUAL FIELD XM: CPT | Performed by: STUDENT IN AN ORGANIZED HEALTH CARE EDUCATION/TRAINING PROGRAM

## 2025-03-11 PROCEDURE — 99213 OFFICE O/P EST LOW 20 MIN: CPT | Performed by: STUDENT IN AN ORGANIZED HEALTH CARE EDUCATION/TRAINING PROGRAM

## 2025-03-11 ASSESSMENT — REFRACTION_CURRENTRX
OD_AXIS: 080
OD_CYLINDER: SPH
OD_SPHERE: +3.25
OS_AXIS: 105
OS_SPHERE: +2.50
OD_VPRISM_DIRECTION: SV
OS_AXIS: 089
OD_VPRISM_DIRECTION: SV
OD_CYLINDER: -0.50
OD_CYLINDER: -0.50
OS_AXIS: 107
OD_SPHERE: +4.75
OS_SPHERE: +1.75
OS_AXIS: 108
OS_OVR_VA: 20/
OD_HPRISM: 1
OS_HPRISM: 1
OD_AXIS: 075
OD_AXIS: 049
OD_CYLINDER: -0.75
OS_CYLINDER: -0.25
OS_SPHERE: +4.25
OS_OVR_VA: 20/
OS_VPRISM_DIRECTION: SV
OS_CYLINDER: -1.00
OS_VPRISM_DIRECTION: SV
OS_VPRISM_DIRECTION: SV
OD_OVR_VA: 20/
OS_OVR_VA: 20/
OD_VPRISM_DIRECTION: SV
OD_SPHERE: +1.75
OD_OVR_VA: 20/
OD_SPHERE: +5.00
OS_CYLINDER: SPH
OD_AXIS: 081
OD_OVR_VA: 20/
OS_CYLINDER: -0.25
OS_CYLINDER: -0.25
OS_SPHERE: +3.00
OS_SPHERE: +4.50
OD_CYLINDER: -0.50
OD_SPHERE: +2.50

## 2025-03-11 ASSESSMENT — REFRACTION_MANIFEST
OD_ADD: +1.75
OS_CYLINDER: -0.50
OS_SPHERE: +2.00
OD_CYLINDER: -0.75
OD_AXIS: 075
OS_AXIS: 105
OD_VPRISM_DIRECTION: BI
OD_VA1: 20/20
OS_SPHERE: +3.00
OS_VPRISM: BU
OD_AXIS: 075
OS_AXIS: 108
OS_CYLINDER: -0.50
OS_VA1: 20/20
OS_HPRISM: 1
OD_CYLINDER: -0.75
OD_CYLINDER: -0.75
OD_SPHERE: +2.75
OD_HPRISM: 1
OD_AXIS: 075
OD_CYLINDER: -0.75
OD_SPHERE: +5.25
OS_VA1: 20/20
OS_SPHERE: +4.75
OD_SPHERE: +3.25
OS_AXIS: 100
OD_SPHERE: +2.50
OS_AXIS: 105
OS_CYLINDER: -1.00
OS_VPRISM_DIRECTION: BI
OD_AXIS: 080
OD_VPRISM: BD
OS_ADD: +1.75
OS_CYLINDER: -0.50
OS_SPHERE: +2.00
OD_VA1: 20/20

## 2025-03-11 ASSESSMENT — REFRACTION_AUTOREFRACTION
OS_SPHERE: +3.25
OS_CYLINDER: -1.00
OD_AXIS: 090
OS_AXIS: 094
OD_SPHERE: +3.75
OD_CYLINDER: -1.00

## 2025-03-11 ASSESSMENT — KERATOMETRY
OD_K2POWER_DIOPTERS: 42.25
OS_AXISANGLE_DEGREES: 090
OS_K2POWER_DIOPTERS: 42.25
OS_K1POWER_DIOPTERS: 42.25
OD_AXISANGLE_DEGREES: 087
OD_K1POWER_DIOPTERS: 41.75

## 2025-03-11 ASSESSMENT — TONOMETRY
OD_IOP_MMHG: 18
OS_IOP_MMHG: 18

## 2025-03-11 ASSESSMENT — PACHYMETRY
OS_CT_CORRECTION: -5
OS_CT_UM: 610
OD_CT_UM: 610
OD_CT_CORRECTION: -5

## 2025-03-11 ASSESSMENT — CONFRONTATIONAL VISUAL FIELD TEST (CVF)
OS_FINDINGS: FULL
OD_FINDINGS: FULL

## 2025-03-11 ASSESSMENT — VISUAL ACUITY
OS_BCVA: 20/25
OD_BCVA: 20/20

## 2025-03-31 ENCOUNTER — APPOINTMENT (OUTPATIENT)
Dept: UROLOGY | Facility: CLINIC | Age: 60
End: 2025-03-31
Payer: MEDICARE

## 2025-03-31 VITALS — SYSTOLIC BLOOD PRESSURE: 97 MMHG | HEART RATE: 97 BPM | DIASTOLIC BLOOD PRESSURE: 68 MMHG

## 2025-03-31 DIAGNOSIS — N39.46 MIXED INCONTINENCE: ICD-10-CM

## 2025-03-31 DIAGNOSIS — N32.81 OVERACTIVE BLADDER: ICD-10-CM

## 2025-03-31 PROCEDURE — 99214 OFFICE O/P EST MOD 30 MIN: CPT

## 2025-03-31 RX ORDER — VIBEGRON 75 MG/1
75 TABLET, FILM COATED ORAL
Qty: 30 | Refills: 5 | Status: ACTIVE | COMMUNITY
Start: 2025-03-31 | End: 1900-01-01

## 2025-04-01 LAB
APPEARANCE: CLEAR
BILIRUBIN URINE: NEGATIVE
BLOOD URINE: NEGATIVE
COLOR: YELLOW
GLUCOSE QUALITATIVE U: NEGATIVE
KETONES URINE: NEGATIVE
LEUKOCYTE ESTERASE URINE: NEGATIVE
NITRITE URINE: NEGATIVE
PH URINE: 5.5
PROTEIN URINE: ABNORMAL
SPECIFIC GRAVITY URINE: >=1.03
UROBILINOGEN URINE: 0.2 (ref 0.2–?)

## 2025-04-02 ENCOUNTER — APPOINTMENT (OUTPATIENT)
Dept: ORTHOPEDIC SURGERY | Facility: CLINIC | Age: 60
End: 2025-04-02
Payer: OTHER MISCELLANEOUS

## 2025-04-02 VITALS — WEIGHT: 225 LBS | HEIGHT: 70 IN | BODY MASS INDEX: 32.21 KG/M2

## 2025-04-02 DIAGNOSIS — M17.11 UNILATERAL PRIMARY OSTEOARTHRITIS, RIGHT KNEE: ICD-10-CM

## 2025-04-02 DIAGNOSIS — M17.12 UNILATERAL PRIMARY OSTEOARTHRITIS, LEFT KNEE: ICD-10-CM

## 2025-04-02 PROCEDURE — 99213 OFFICE O/P EST LOW 20 MIN: CPT

## 2025-04-15 ENCOUNTER — APPOINTMENT (OUTPATIENT)
Dept: UROLOGY | Facility: CLINIC | Age: 60
End: 2025-04-15
Payer: COMMERCIAL

## 2025-04-15 DIAGNOSIS — N32.81 OVERACTIVE BLADDER: ICD-10-CM

## 2025-04-15 DIAGNOSIS — N39.46 MIXED INCONTINENCE: ICD-10-CM

## 2025-04-15 PROCEDURE — 99212 OFFICE O/P EST SF 10 MIN: CPT | Mod: 3W

## 2025-04-21 ENCOUNTER — RESULT REVIEW (OUTPATIENT)
Age: 60
End: 2025-04-21

## 2025-05-14 ENCOUNTER — APPOINTMENT (OUTPATIENT)
Dept: ORTHOPEDIC SURGERY | Facility: CLINIC | Age: 60
End: 2025-05-14
Payer: OTHER MISCELLANEOUS

## 2025-05-14 DIAGNOSIS — M17.12 UNILATERAL PRIMARY OSTEOARTHRITIS, LEFT KNEE: ICD-10-CM

## 2025-05-14 DIAGNOSIS — M17.11 UNILATERAL PRIMARY OSTEOARTHRITIS, RIGHT KNEE: ICD-10-CM

## 2025-05-14 PROCEDURE — 99214 OFFICE O/P EST MOD 30 MIN: CPT

## 2025-06-25 ENCOUNTER — APPOINTMENT (OUTPATIENT)
Dept: ORTHOPEDIC SURGERY | Facility: CLINIC | Age: 60
End: 2025-06-25
Payer: OTHER MISCELLANEOUS

## 2025-06-25 PROBLEM — M17.0 PRIMARY OSTEOARTHRITIS OF BOTH KNEES: Status: ACTIVE | Noted: 2025-06-25

## 2025-06-25 PROCEDURE — 99214 OFFICE O/P EST MOD 30 MIN: CPT

## 2025-06-25 RX ORDER — METHYLPREDNISOLONE 4 MG/1
4 TABLET ORAL
Qty: 1 | Refills: 0 | Status: ACTIVE | COMMUNITY
Start: 2025-06-25 | End: 1900-01-01

## 2025-06-28 ENCOUNTER — NON-APPOINTMENT (OUTPATIENT)
Age: 60
End: 2025-06-28

## 2025-06-30 ENCOUNTER — APPOINTMENT (OUTPATIENT)
Dept: UROLOGY | Facility: CLINIC | Age: 60
End: 2025-06-30
Payer: MEDICARE

## 2025-06-30 PROCEDURE — 99214 OFFICE O/P EST MOD 30 MIN: CPT

## 2025-07-30 ENCOUNTER — APPOINTMENT (OUTPATIENT)
Dept: ORTHOPEDIC SURGERY | Facility: CLINIC | Age: 60
End: 2025-07-30
Payer: OTHER MISCELLANEOUS

## 2025-07-30 DIAGNOSIS — M17.11 UNILATERAL PRIMARY OSTEOARTHRITIS, RIGHT KNEE: ICD-10-CM

## 2025-07-30 DIAGNOSIS — M17.12 UNILATERAL PRIMARY OSTEOARTHRITIS, LEFT KNEE: ICD-10-CM

## 2025-07-30 PROCEDURE — 73562 X-RAY EXAM OF KNEE 3: CPT | Mod: 50

## 2025-07-30 PROCEDURE — 99214 OFFICE O/P EST MOD 30 MIN: CPT

## 2025-09-10 ENCOUNTER — APPOINTMENT (OUTPATIENT)
Dept: ORTHOPEDIC SURGERY | Facility: CLINIC | Age: 60
End: 2025-09-10
Payer: OTHER MISCELLANEOUS

## 2025-09-10 DIAGNOSIS — M17.11 UNILATERAL PRIMARY OSTEOARTHRITIS, RIGHT KNEE: ICD-10-CM

## 2025-09-10 DIAGNOSIS — M17.0 BILATERAL PRIMARY OSTEOARTHRITIS OF KNEE: ICD-10-CM

## 2025-09-10 DIAGNOSIS — M17.12 UNILATERAL PRIMARY OSTEOARTHRITIS, LEFT KNEE: ICD-10-CM

## 2025-09-10 PROCEDURE — 99214 OFFICE O/P EST MOD 30 MIN: CPT
